# Patient Record
Sex: FEMALE | Race: ASIAN | NOT HISPANIC OR LATINO
[De-identification: names, ages, dates, MRNs, and addresses within clinical notes are randomized per-mention and may not be internally consistent; named-entity substitution may affect disease eponyms.]

---

## 2019-03-19 ENCOUNTER — APPOINTMENT (OUTPATIENT)
Dept: HUMAN REPRODUCTION | Facility: CLINIC | Age: 29
End: 2019-03-19

## 2019-04-15 ENCOUNTER — APPOINTMENT (OUTPATIENT)
Dept: OBGYN | Facility: CLINIC | Age: 29
End: 2019-04-15
Payer: COMMERCIAL

## 2019-04-15 ENCOUNTER — TRANSCRIPTION ENCOUNTER (OUTPATIENT)
Age: 29
End: 2019-04-15

## 2019-04-15 VITALS
BODY MASS INDEX: 33.8 KG/M2 | WEIGHT: 223 LBS | HEIGHT: 68 IN | SYSTOLIC BLOOD PRESSURE: 130 MMHG | DIASTOLIC BLOOD PRESSURE: 80 MMHG

## 2019-04-15 DIAGNOSIS — Z84.89 FAMILY HISTORY OF OTHER SPECIFIED CONDITIONS: ICD-10-CM

## 2019-04-15 DIAGNOSIS — Z80.1 FAMILY HISTORY OF MALIGNANT NEOPLASM OF TRACHEA, BRONCHUS AND LUNG: ICD-10-CM

## 2019-04-15 DIAGNOSIS — Z82.49 FAMILY HISTORY OF ISCHEMIC HEART DISEASE AND OTHER DISEASES OF THE CIRCULATORY SYSTEM: ICD-10-CM

## 2019-04-15 DIAGNOSIS — Z86.39 PERSONAL HISTORY OF OTHER ENDOCRINE, NUTRITIONAL AND METABOLIC DISEASE: ICD-10-CM

## 2019-04-15 DIAGNOSIS — Z83.3 FAMILY HISTORY OF DIABETES MELLITUS: ICD-10-CM

## 2019-04-15 DIAGNOSIS — Z87.42 PERSONAL HISTORY OF OTHER DISEASES OF THE FEMALE GENITAL TRACT: ICD-10-CM

## 2019-04-15 DIAGNOSIS — Z78.9 OTHER SPECIFIED HEALTH STATUS: ICD-10-CM

## 2019-04-15 PROCEDURE — 99385 PREV VISIT NEW AGE 18-39: CPT

## 2019-04-15 NOTE — CHIEF COMPLAINT
[Initial Visit] : initial GYN visit [FreeTextEntry1] : \par \par \par Patient presents for annual GYN examination\par \par Street of abnormal Pap and HPV positive status\par HPV DNA positive September 2017\par 16/18/45 negative September 2017\par Pap smear result unclear\par Colposcopy was performed with cervical biopsy October 2017\par ECC negative\par Biopsy at 10:00 the squamous metaplasia negative for dysplasia\par \par Pap smear negative in June 2, 2018\par HPV DNA negative June 2, 2018\par Gonorrhea and Chlamydia negative June 2, 2018\par \par \par Endocrinology issues\par Hypothyroidism - followed by Dr. Armendariz in endocrinology\par Levothyroxine 50 mcg\par \par \par PCOS - also followed by Dr. Armendariz in endocrinology\par Patient could not tolerate the course of metformin due to nausea and vomiting (September 2018)\par

## 2019-04-15 NOTE — HISTORY OF PRESENT ILLNESS
[Good] : being in good health [Reproductive Age] : is of reproductive age [de-identified] : june 2018 [Continuous] : no continuous [Dull] : no dull [Sharp] : no sharp [Stabbing] : no stabbing [Throbbing] : no throbbing [Burning] : no burning [Itching] : no itching [Mass] : no mass [Stinging] : no stinging [Lesion] : no lesion [Soreness] : no soreness [Discharge] : no discharge [Localized Pain] : no localized pain [Diffused Pain] : no diffused pain [Mass (___cm)] : no palpable mass [Skin Color Change] : no skin color change [Nipple Discharge] : no nipple discharge [Hot Flashes] : no hot flashes [Night Sweats] : no night sweats [Vaginal Itching] : no vaginal itching [Dyspareunia] : no dyspareunia [Mood Changes] : no mood changes [Definite:  ___ (Date)] : the last menstrual period was [unfilled] [Spotting Between  Menses] : no spotting between menses [Normal Amount/Duration] : was of a normal amount and duration [Frequency: Q ___ days] : menstrual periods occur approximately every [unfilled] days [Regular Cycle Intervals] : periods have been regular [Menarche Age: ____] : age at menarche was [unfilled] [Currently In Menopause] : not currently in menopause [Menstrual Cramps] : no menstrual cramps [Experiencing Menopausal Sxs] : not experiencing menopausal symptoms [Age of First Sexual [place] ___] : became sexually active at the age of [unfilled] [Sexually Active] : is sexually active [Monogamous] : is monogamous [Contraception] : uses contraception [IUD] : has an intrauterine device [Male ___] : [unfilled] male [de-identified] : current partner 2013

## 2019-04-15 NOTE — PHYSICAL EXAM
[Awake] : awake [Acute Distress] : no acute distress [Alert] : alert [LAD] : no lymphadenopathy [Thyroid Nodule] : no thyroid nodule [Goiter] : no goiter [Mass] : no breast mass [Soft] : soft [Nipple Discharge] : no nipple discharge [Axillary LAD] : no axillary lymphadenopathy [Tender] : non tender [Distended] : not distended [H/Smegaly] : no hepatosplenomegaly [Oriented x3] : oriented to person, place, and time [Depressed Mood] : not depressed [Flat Affect] : affect not flat [Labia Majora] : labia major [Labia Minora] : labia minora [Normal] : clitoris [No Bleeding] : there was no active vaginal bleeding [IUD String] : did not have an IUD string protruding out [Pap Obtained] : a Pap smear was performed [Discharge] : had no discharge [Motion Tenderness] : there was no cervical motion tenderness [Enlarged ___ wks] : not enlarged [Tenderness] : nontender [Mass ___ cm] : no uterine mass was palpated [Uterine Adnexae] : were not tender and not enlarged [No Tenderness] : no rectal tenderness

## 2019-04-18 LAB
C TRACH RRNA SPEC QL NAA+PROBE: NOT DETECTED
CYTOLOGY CVX/VAG DOC THIN PREP: NORMAL
N GONORRHOEA RRNA SPEC QL NAA+PROBE: NOT DETECTED
SOURCE AMPLIFICATION: NORMAL

## 2019-12-13 ENCOUNTER — RESULT CHARGE (OUTPATIENT)
Age: 29
End: 2019-12-13

## 2019-12-13 ENCOUNTER — APPOINTMENT (OUTPATIENT)
Dept: OBGYN | Facility: CLINIC | Age: 29
End: 2019-12-13
Payer: COMMERCIAL

## 2019-12-13 VITALS
SYSTOLIC BLOOD PRESSURE: 123 MMHG | HEIGHT: 68 IN | BODY MASS INDEX: 34.31 KG/M2 | WEIGHT: 226.38 LBS | DIASTOLIC BLOOD PRESSURE: 88 MMHG

## 2019-12-13 PROCEDURE — 99213 OFFICE O/P EST LOW 20 MIN: CPT

## 2020-01-10 LAB — HCG UR QL: NEGATIVE

## 2020-01-16 NOTE — PHYSICAL EXAM
[Awake] : awake [Alert] : alert [Acute Distress] : no acute distress [Soft] : soft [Tender] : non tender [Oriented x3] : oriented to person, place, and time [Normal] : vagina [No Bleeding] : there was no active vaginal bleeding [Uterine Adnexae] : were not tender and not enlarged

## 2020-01-16 NOTE — REVIEW OF SYSTEMS
[Dysuria] : dysuria [Pelvic Pain] : no pelvic pain [Abn Vag Bleeding] : no abnormal vaginal bleeding [Nl] : Integumentary

## 2020-09-17 ENCOUNTER — APPOINTMENT (OUTPATIENT)
Dept: OBGYN | Facility: CLINIC | Age: 30
End: 2020-09-17
Payer: COMMERCIAL

## 2020-09-17 VITALS
WEIGHT: 226 LBS | HEIGHT: 68 IN | BODY MASS INDEX: 34.25 KG/M2 | DIASTOLIC BLOOD PRESSURE: 62 MMHG | SYSTOLIC BLOOD PRESSURE: 120 MMHG

## 2020-09-17 PROCEDURE — 58301 REMOVE INTRAUTERINE DEVICE: CPT

## 2020-09-17 RX ORDER — CLOTRIMAZOLE AND BETAMETHASONE DIPROPIONATE 10; .5 MG/G; MG/G
1-0.05 CREAM TOPICAL TWICE DAILY
Qty: 1 | Refills: 0 | Status: COMPLETED | COMMUNITY
Start: 2019-12-13 | End: 2020-09-17

## 2020-09-17 NOTE — PROCEDURE
[IUD Removal] : intrauterine device (IUD) removal [Fertility Desired] : fertility desired [Risks] : risks [Benefits] : benefits [Alternatives] : alternatives [Patient] : patient [Speculum Placed] : speculum placed [Strings Visualized] : strings visualized [IUD Discarded] : IUD discarded [Tolerated Well] : Patient tolerated the procedure well [No Complications] : no complications

## 2021-02-25 ENCOUNTER — APPOINTMENT (OUTPATIENT)
Dept: OBGYN | Facility: CLINIC | Age: 31
End: 2021-02-25
Payer: COMMERCIAL

## 2021-02-25 VITALS — TEMPERATURE: 97.1 F

## 2021-02-25 VITALS — SYSTOLIC BLOOD PRESSURE: 110 MMHG | WEIGHT: 228 LBS | BODY MASS INDEX: 34.67 KG/M2 | DIASTOLIC BLOOD PRESSURE: 64 MMHG

## 2021-02-25 DIAGNOSIS — Z30.432 ENCOUNTER FOR REMOVAL OF INTRAUTERINE CONTRACEPTIVE DEVICE: ICD-10-CM

## 2021-02-25 DIAGNOSIS — R87.810 CERVICAL HIGH RISK HUMAN PAPILLOMAVIRUS (HPV) DNA TEST POSITIVE: ICD-10-CM

## 2021-02-25 PROCEDURE — 99072 ADDL SUPL MATRL&STAF TM PHE: CPT

## 2021-02-25 PROCEDURE — 99395 PREV VISIT EST AGE 18-39: CPT

## 2021-02-25 NOTE — COUNSELING
[Nutrition/ Exercise/ Weight Management] : nutrition, exercise, weight management [Body Image] : body image [Vitamins/Supplements] : vitamins/supplements [Breast Self Exam] : breast self exam [Contraception/ Emergency Contraception/ Safe Sexual Practices] : contraception, emergency contraception, safe sexual practices

## 2021-02-26 PROBLEM — Z30.432 ENCOUNTER FOR IUD REMOVAL: Status: RESOLVED | Noted: 2020-09-17 | Resolved: 2021-02-26

## 2021-02-26 NOTE — PHYSICAL EXAM
[Appropriately responsive] : appropriately responsive [Alert] : alert [No Acute Distress] : no acute distress [No Lymphadenopathy] : no lymphadenopathy [No Murmurs] : no murmurs [Soft] : soft [Non-tender] : non-tender [Non-distended] : non-distended [No HSM] : No HSM [No Lesions] : no lesions [No Mass] : no mass [Oriented x3] : oriented x3 [Examination Of The Breasts] : a normal appearance [No Masses] : no breast masses were palpable [Labia Majora] : normal [Labia Minora] : normal [Normal] : normal [Uterine Adnexae] : normal [Tenderness] : nontender

## 2021-03-02 LAB
CYTOLOGY CVX/VAG DOC THIN PREP: NORMAL
HPV HIGH+LOW RISK DNA PNL CVX: NOT DETECTED

## 2021-06-07 ENCOUNTER — NON-APPOINTMENT (OUTPATIENT)
Age: 31
End: 2021-06-07

## 2021-06-10 ENCOUNTER — APPOINTMENT (OUTPATIENT)
Dept: OBGYN | Facility: CLINIC | Age: 31
End: 2021-06-10
Payer: COMMERCIAL

## 2021-06-10 VITALS
BODY MASS INDEX: 35.09 KG/M2 | DIASTOLIC BLOOD PRESSURE: 81 MMHG | SYSTOLIC BLOOD PRESSURE: 113 MMHG | WEIGHT: 231.5 LBS | HEIGHT: 68 IN

## 2021-06-10 LAB
BASOPHILS # BLD AUTO: 0.07 K/UL
BASOPHILS NFR BLD AUTO: 0.4 %
EOSINOPHIL # BLD AUTO: 0.15 K/UL
EOSINOPHIL NFR BLD AUTO: 1 %
HCT VFR BLD CALC: 38.6 %
HGB BLD-MCNC: 12.4 G/DL
IMM GRANULOCYTES NFR BLD AUTO: 0.4 %
LYMPHOCYTES # BLD AUTO: 4.17 K/UL
LYMPHOCYTES NFR BLD AUTO: 26.8 %
MAN DIFF?: NORMAL
MCHC RBC-ENTMCNC: 27.8 PG
MCHC RBC-ENTMCNC: 32.1 GM/DL
MCV RBC AUTO: 86.5 FL
MONOCYTES # BLD AUTO: 0.88 K/UL
MONOCYTES NFR BLD AUTO: 5.6 %
NEUTROPHILS # BLD AUTO: 10.24 K/UL
NEUTROPHILS NFR BLD AUTO: 65.8 %
PLATELET # BLD AUTO: 338 K/UL
RBC # BLD: 4.46 M/UL
RBC # FLD: 13.8 %
WBC # FLD AUTO: 15.58 K/UL

## 2021-06-10 PROCEDURE — 99214 OFFICE O/P EST MOD 30 MIN: CPT

## 2021-06-10 RX ORDER — ASCORBIC ACID, CHOLECALCIFEROL, .ALPHA.-TOCOPHEROL ACETATE, DL-, PYRIDOXINE HYDROCHLORIDE, FOLIC ACID, CYANOCOBALAMIN, BIOTIN, CALCIUM CARBONATE, FERROUS ASPARTO GLYCINATE, IRON, POTASSIUM IODIDE, MAGNESIUM OXIDE, DOCONEXENT AND LOWBUSH BLUEBERRY 60; 1000; 10; 26; 400; 13; 280; 80; 9; 9; 150; 25; 350; 25; 600 MG/1; [IU]/1; [IU]/1; MG/1; UG/1; UG/1; UG/1; MG/1; MG/1; MG/1; UG/1; MG/1; MG/1; MG/1; UG/1
18-0.6-0.4-35 CAPSULE, GELATIN COATED ORAL
Qty: 3 | Refills: 3 | Status: COMPLETED | COMMUNITY
Start: 2020-09-17 | End: 2021-06-10

## 2021-06-10 RX ORDER — IRON, FOLIC ACID, CYANOCOBALAMIN, ASCORBIC ACID, DOCUSATE SODIUM AND DHA
90-1 & 300 KIT ORAL
Qty: 90 | Refills: 3 | Status: COMPLETED | COMMUNITY
Start: 2020-09-18 | End: 2021-06-10

## 2021-06-10 RX ORDER — .ALPHA.-TOCOPHEROL ACETATE, DL-, ASCORBIC ACID, CHOLECALCIFEROL, CYANOCOBALAMIN, FOLIC ACID, FERROUS FUMARATE, CALCIUM PHOSPHATE, DIBASIC, ANHYDROUS, NIACINAMIDE, PYRIDOXINE HYDROCHLORIDE, RIBOFLAVIN, THIAMINE MONONITRATE, AND VITAMIN A ACETATE 15; 60; 400; 4.5; 1; 27; 50; 13.5; 1.05; 1.2; 1.05; 25 [IU]/1; MG/1; [IU]/1; UG/1; MG/1; MG/1; MG/1; MG/1; MG/1; MG/1; MG/1; [IU]/1
TABLET ORAL
Refills: 0 | Status: ACTIVE | COMMUNITY

## 2021-06-10 NOTE — PLAN
[FreeTextEntry1] : early IUP\par labs\par repeat sono in a week\par \par \par likely migraine bur r/o TIA (low susp)\par note pt reports jaw pain w migraines in past\par tylenol/benadryl (if nausea pressent) for migraine\par neurology eval\par \par Dr PIERRE Figueroa

## 2021-06-10 NOTE — PROCEDURE
[Transvaginal OB Sonogram] : Transvaginal OB Sonogram [Intrauterine Pregnancy] : intrauterine pregnancy [Yolk Sac] : yolk sac present [CRL: ___ (mm)] : CRL - [unfilled]Umm [Fetal Heart] : no fetal heart [FreeTextEntry1] : early preg\par \par HCG level, repeat and RTO in 1 week

## 2021-06-10 NOTE — HISTORY OF PRESENT ILLNESS
[FreeTextEntry1] : 31yo  LMP here w missed menses,  cramping, headaches\par here to establish viability of preg w + HCG\par \par pt w one day od headache, lower facial numbness and slurred speak\par took tyelonol and slept w improvement in pain\par no pain today\par no Gold, CP, SOB\par no F/c\par no reported illnesses

## 2021-06-10 NOTE — COUNSELING
[Nutrition/ Exercise/ Weight Management] : nutrition, exercise, weight management [Body Image] : body image [Vitamins/Supplements] : vitamins/supplements [Bladder Hygiene] : bladder hygiene [Preconception Care/ Fertility options] : preconception care, fertility options [STD (testing, results, tx)] : STD (testing, results, tx)

## 2021-06-10 NOTE — PHYSICAL EXAM
[Appropriately responsive] : appropriately responsive [No Acute Distress] : no acute distress [Labia Majora] : normal [Labia Minora] : normal [Normal] : normal [Uterine Adnexae] : normal [FreeTextEntry6] : no CMT

## 2021-06-10 NOTE — REVIEW OF SYSTEMS
[Headache] : headache [Anxiety] : anxiety [Negative] : Heme/Lymph [Sore Throat] : no sore throat [Cough] : no cough [Chest Pain] : no chest pain [Palpitations] : no palpitations [Abdominal Pain] : no abdominal pain [Constipation] : no constipation [Urgency] : no urgency [Frequency] : no frequency [Dizziness] : no dizziness [Fainting] : no fainting [Convulsions] : no convulsions

## 2021-06-15 LAB
ABO + RH PNL BLD: NORMAL
BLD GP AB SCN SERPL QL: NORMAL
C TRACH RRNA SPEC QL NAA+PROBE: NOT DETECTED
N GONORRHOEA RRNA SPEC QL NAA+PROBE: NOT DETECTED
PROGEST SERPL-MCNC: 13.1 NG/ML
SOURCE AMPLIFICATION: NORMAL

## 2021-06-17 LAB
HCG SERPL-MCNC: 5870 MIU/ML
HCG SERPL-MCNC: ABNORMAL MIU/ML
TSH SERPL-ACNC: 2.65 UIU/ML

## 2021-06-24 ENCOUNTER — APPOINTMENT (OUTPATIENT)
Dept: OBGYN | Facility: CLINIC | Age: 31
End: 2021-06-24
Payer: COMMERCIAL

## 2021-06-24 ENCOUNTER — NON-APPOINTMENT (OUTPATIENT)
Age: 31
End: 2021-06-24

## 2021-06-24 VITALS
SYSTOLIC BLOOD PRESSURE: 111 MMHG | HEIGHT: 68 IN | BODY MASS INDEX: 35.09 KG/M2 | WEIGHT: 231.5 LBS | DIASTOLIC BLOOD PRESSURE: 80 MMHG

## 2021-06-24 DIAGNOSIS — Z31.69 ENCOUNTER FOR OTHER GENERAL COUNSELING AND ADVICE ON PROCREATION: ICD-10-CM

## 2021-06-24 DIAGNOSIS — N94.89 OTHER SPECIFIED CONDITIONS ASSOCIATED WITH FEMALE GENITAL ORGANS AND MENSTRUAL CYCLE: ICD-10-CM

## 2021-06-24 DIAGNOSIS — Z87.59 PERSONAL HISTORY OF OTHER COMPLICATIONS OF PREGNANCY, CHILDBIRTH AND THE PUERPERIUM: ICD-10-CM

## 2021-06-24 DIAGNOSIS — N76.0 ACUTE VAGINITIS: ICD-10-CM

## 2021-06-24 DIAGNOSIS — R10.2 PELVIC AND PERINEAL PAIN: ICD-10-CM

## 2021-06-24 DIAGNOSIS — Z30.431 ENCOUNTER FOR ROUTINE CHECKING OF INTRAUTERINE CONTRACEPTIVE DEVICE: ICD-10-CM

## 2021-06-24 PROCEDURE — 0501F PRENATAL FLOW SHEET: CPT

## 2021-07-12 ENCOUNTER — APPOINTMENT (OUTPATIENT)
Dept: OBGYN | Facility: CLINIC | Age: 31
End: 2021-07-12
Payer: COMMERCIAL

## 2021-07-12 ENCOUNTER — NON-APPOINTMENT (OUTPATIENT)
Age: 31
End: 2021-07-12

## 2021-07-12 VITALS
SYSTOLIC BLOOD PRESSURE: 114 MMHG | HEIGHT: 68 IN | DIASTOLIC BLOOD PRESSURE: 78 MMHG | BODY MASS INDEX: 34.86 KG/M2 | WEIGHT: 230 LBS

## 2021-07-12 PROCEDURE — 0502F SUBSEQUENT PRENATAL CARE: CPT

## 2021-07-12 RX ORDER — AMOXICILLIN 500 MG/1
500 TABLET, FILM COATED ORAL
Qty: 21 | Refills: 0 | Status: COMPLETED | COMMUNITY
Start: 2021-05-15

## 2021-07-15 LAB
ABO + RH PNL BLD: NORMAL
ALBUMIN SERPL ELPH-MCNC: 3.8 G/DL
ALP BLD-CCNC: 79 U/L
ALT SERPL-CCNC: 12 U/L
ANION GAP SERPL CALC-SCNC: 10 MMOL/L
AST SERPL-CCNC: 13 U/L
B19V IGG SER QL IA: 0.33 INDEX
B19V IGG+IGM SER-IMP: NEGATIVE
B19V IGG+IGM SER-IMP: NORMAL
B19V IGM FLD-ACNC: 0.14 INDEX
B19V IGM SER-ACNC: NEGATIVE
BACTERIA UR CULT: NORMAL
BASOPHILS # BLD AUTO: 0.05 K/UL
BASOPHILS NFR BLD AUTO: 0.3 %
BILIRUB SERPL-MCNC: 0.2 MG/DL
BLD GP AB SCN SERPL QL: NORMAL
BUN SERPL-MCNC: 7 MG/DL
CALCIUM SERPL-MCNC: 9.5 MG/DL
CHLORIDE SERPL-SCNC: 106 MMOL/L
CMV IGG SERPL QL: 8.2 U/ML
CMV IGG SERPL-IMP: POSITIVE
CMV IGM SERPL QL: <8 AU/ML
CMV IGM SERPL QL: NEGATIVE
CO2 SERPL-SCNC: 18 MMOL/L
CREAT SERPL-MCNC: 0.56 MG/DL
EOSINOPHIL # BLD AUTO: 0.13 K/UL
EOSINOPHIL NFR BLD AUTO: 0.9 %
GLUCOSE 1H P 50 G GLC PO SERPL-MCNC: 98 MG/DL
GLUCOSE SERPL-MCNC: 117 MG/DL
HBV SURFACE AG SER QL: NONREACTIVE
HCT VFR BLD CALC: 37.7 %
HCV AB SER QL: NONREACTIVE
HCV S/CO RATIO: 0.08 S/CO
HGB A MFR BLD: 97.6 %
HGB A2 MFR BLD: 2.4 %
HGB BLD-MCNC: 11.9 G/DL
HGB FRACT BLD-IMP: NORMAL
HIV1+2 AB SPEC QL IA.RAPID: NONREACTIVE
IMM GRANULOCYTES NFR BLD AUTO: 0.5 %
LEAD BLD-MCNC: <1 UG/DL
LYMPHOCYTES # BLD AUTO: 3.11 K/UL
LYMPHOCYTES NFR BLD AUTO: 21.6 %
MAN DIFF?: NORMAL
MCHC RBC-ENTMCNC: 27.7 PG
MCHC RBC-ENTMCNC: 31.6 GM/DL
MCV RBC AUTO: 87.9 FL
MEV IGG FLD QL IA: <5 AU/ML
MEV IGG+IGM SER-IMP: NEGATIVE
MONOCYTES # BLD AUTO: 0.74 K/UL
MONOCYTES NFR BLD AUTO: 5.1 %
NEUTROPHILS # BLD AUTO: 10.31 K/UL
NEUTROPHILS NFR BLD AUTO: 71.6 %
PLATELET # BLD AUTO: 301 K/UL
POTASSIUM SERPL-SCNC: 3.7 MMOL/L
PROT SERPL-MCNC: 6.1 G/DL
RBC # BLD: 4.29 M/UL
RBC # FLD: 13.7 %
RUBV IGG FLD-ACNC: 1 INDEX
RUBV IGG SER-IMP: NORMAL
SODIUM SERPL-SCNC: 135 MMOL/L
T PALLIDUM AB SER QL IA: NEGATIVE
TSH SERPL-ACNC: 1.12 UIU/ML
VZV AB TITR SER: NEGATIVE
VZV IGG SER IF-ACNC: 20 INDEX
WBC # FLD AUTO: 14.41 K/UL

## 2021-07-16 ENCOUNTER — TRANSCRIPTION ENCOUNTER (OUTPATIENT)
Age: 31
End: 2021-07-16

## 2021-07-16 ENCOUNTER — NON-APPOINTMENT (OUTPATIENT)
Age: 31
End: 2021-07-16

## 2021-07-20 LAB
AR GENE MUT ANL BLD/T: NORMAL
CFTR MUT TESTED BLD/T: NEGATIVE
CLARI ADDITIONAL INFO: NORMAL
CLARI CHROMOSOME 13: NORMAL
CLARI CHROMOSOME 18: NORMAL
CLARI CHROMOSOME 21: NORMAL
CLARI SEX CHROMOSOMES: NORMAL
CLARITEST NIPT: NORMAL
FMR1 GENE MUT ANL BLD/T: NORMAL

## 2021-07-26 ENCOUNTER — APPOINTMENT (OUTPATIENT)
Dept: ANTEPARTUM | Facility: CLINIC | Age: 31
End: 2021-07-26
Payer: COMMERCIAL

## 2021-07-26 ENCOUNTER — ASOB RESULT (OUTPATIENT)
Age: 31
End: 2021-07-26

## 2021-07-26 ENCOUNTER — LABORATORY RESULT (OUTPATIENT)
Age: 31
End: 2021-07-26

## 2021-07-26 PROCEDURE — 76813 OB US NUCHAL MEAS 1 GEST: CPT | Mod: 59

## 2021-07-26 PROCEDURE — 76801 OB US < 14 WKS SINGLE FETUS: CPT

## 2021-08-10 ENCOUNTER — NON-APPOINTMENT (OUTPATIENT)
Age: 31
End: 2021-08-10

## 2021-08-16 ENCOUNTER — APPOINTMENT (OUTPATIENT)
Dept: OBGYN | Facility: CLINIC | Age: 31
End: 2021-08-16
Payer: COMMERCIAL

## 2021-08-16 VITALS — DIASTOLIC BLOOD PRESSURE: 80 MMHG | WEIGHT: 227 LBS | SYSTOLIC BLOOD PRESSURE: 116 MMHG | BODY MASS INDEX: 34.52 KG/M2

## 2021-08-16 DIAGNOSIS — Z34.91 ENCOUNTER FOR SUPERVISION OF NORMAL PREGNANCY, UNSPECIFIED, FIRST TRIMESTER: ICD-10-CM

## 2021-08-16 PROCEDURE — 0502F SUBSEQUENT PRENATAL CARE: CPT

## 2021-08-25 LAB
1ST TRIMESTER DATA: NORMAL
2ND TRIMESTER DATA: NORMAL
AFP PNL SERPL: NORMAL
AFP SERPL-ACNC: NORMAL
AFP SERPL-ACNC: NORMAL
B-HCG FREE SERPL-MCNC: NORMAL
CLINICAL BIOCHEMIST REVIEW: NORMAL
FREE BETA HCG 1ST TRIMESTER: NORMAL
INHIBIN A SERPL-MCNC: NORMAL
NOTES NTD: NORMAL
NT: NORMAL
PAPP-A SERPL-ACNC: NORMAL
U ESTRIOL SERPL-SCNC: NORMAL

## 2021-09-15 ENCOUNTER — NON-APPOINTMENT (OUTPATIENT)
Age: 31
End: 2021-09-15

## 2021-09-17 ENCOUNTER — ASOB RESULT (OUTPATIENT)
Age: 31
End: 2021-09-17

## 2021-09-17 ENCOUNTER — APPOINTMENT (OUTPATIENT)
Dept: ANTEPARTUM | Facility: CLINIC | Age: 31
End: 2021-09-17
Payer: COMMERCIAL

## 2021-09-17 ENCOUNTER — APPOINTMENT (OUTPATIENT)
Dept: OBGYN | Facility: CLINIC | Age: 31
End: 2021-09-17
Payer: COMMERCIAL

## 2021-09-17 VITALS — WEIGHT: 232 LBS | DIASTOLIC BLOOD PRESSURE: 60 MMHG | BODY MASS INDEX: 35.28 KG/M2 | SYSTOLIC BLOOD PRESSURE: 112 MMHG

## 2021-09-17 PROCEDURE — 76811 OB US DETAILED SNGL FETUS: CPT

## 2021-09-17 PROCEDURE — 0502F SUBSEQUENT PRENATAL CARE: CPT

## 2021-09-27 ENCOUNTER — APPOINTMENT (OUTPATIENT)
Dept: OBGYN | Facility: CLINIC | Age: 31
End: 2021-09-27

## 2021-09-27 ENCOUNTER — NON-APPOINTMENT (OUTPATIENT)
Age: 31
End: 2021-09-27

## 2021-09-29 ENCOUNTER — APPOINTMENT (OUTPATIENT)
Dept: ANTEPARTUM | Facility: CLINIC | Age: 31
End: 2021-09-29

## 2021-09-30 ENCOUNTER — NON-APPOINTMENT (OUTPATIENT)
Age: 31
End: 2021-09-30

## 2021-10-28 ENCOUNTER — NON-APPOINTMENT (OUTPATIENT)
Age: 31
End: 2021-10-28

## 2021-10-28 ENCOUNTER — APPOINTMENT (OUTPATIENT)
Dept: OBGYN | Facility: CLINIC | Age: 31
End: 2021-10-28
Payer: COMMERCIAL

## 2021-10-28 VITALS
BODY MASS INDEX: 35.47 KG/M2 | SYSTOLIC BLOOD PRESSURE: 128 MMHG | WEIGHT: 233.25 LBS | DIASTOLIC BLOOD PRESSURE: 88 MMHG

## 2021-10-28 VITALS — DIASTOLIC BLOOD PRESSURE: 78 MMHG | SYSTOLIC BLOOD PRESSURE: 124 MMHG

## 2021-10-28 VITALS — SYSTOLIC BLOOD PRESSURE: 130 MMHG | DIASTOLIC BLOOD PRESSURE: 90 MMHG

## 2021-10-28 DIAGNOSIS — J06.9 ACUTE UPPER RESPIRATORY INFECTION, UNSPECIFIED: ICD-10-CM

## 2021-10-28 PROCEDURE — 81003 URINALYSIS AUTO W/O SCOPE: CPT | Mod: QW

## 2021-10-28 PROCEDURE — 90656 IIV3 VACC NO PRSV 0.5 ML IM: CPT

## 2021-10-28 PROCEDURE — 0502F SUBSEQUENT PRENATAL CARE: CPT

## 2021-10-28 PROCEDURE — 90471 IMMUNIZATION ADMIN: CPT

## 2021-10-28 RX ORDER — AZITHROMYCIN 250 MG/1
250 TABLET, FILM COATED ORAL
Qty: 1 | Refills: 0 | Status: COMPLETED | COMMUNITY
Start: 2021-09-29 | End: 2021-10-28

## 2021-10-29 LAB
ALBUMIN SERPL ELPH-MCNC: 3.5 G/DL
ALP BLD-CCNC: 99 U/L
ALT SERPL-CCNC: 14 U/L
ANION GAP SERPL CALC-SCNC: 12 MMOL/L
AST SERPL-CCNC: 13 U/L
BASOPHILS # BLD AUTO: 0.03 K/UL
BASOPHILS NFR BLD AUTO: 0.2 %
BILIRUB SERPL-MCNC: <0.2 MG/DL
BUN SERPL-MCNC: 8 MG/DL
CALCIUM SERPL-MCNC: 10 MG/DL
CHLORIDE SERPL-SCNC: 107 MMOL/L
CO2 SERPL-SCNC: 19 MMOL/L
CREAT SERPL-MCNC: 0.57 MG/DL
EOSINOPHIL # BLD AUTO: 0.09 K/UL
EOSINOPHIL NFR BLD AUTO: 0.6 %
GLUCOSE 1H P 50 G GLC PO SERPL-MCNC: 99 MG/DL
GLUCOSE SERPL-MCNC: 104 MG/DL
HCT VFR BLD CALC: 35.7 %
HGB BLD-MCNC: 11.4 G/DL
HIV1+2 AB SPEC QL IA.RAPID: NONREACTIVE
IMM GRANULOCYTES NFR BLD AUTO: 0.7 %
LYMPHOCYTES # BLD AUTO: 2.46 K/UL
LYMPHOCYTES NFR BLD AUTO: 16.9 %
MAN DIFF?: NORMAL
MCHC RBC-ENTMCNC: 27.7 PG
MCHC RBC-ENTMCNC: 31.9 GM/DL
MCV RBC AUTO: 86.7 FL
MONOCYTES # BLD AUTO: 0.88 K/UL
MONOCYTES NFR BLD AUTO: 6.1 %
NEUTROPHILS # BLD AUTO: 10.96 K/UL
NEUTROPHILS NFR BLD AUTO: 75.5 %
PLATELET # BLD AUTO: 307 K/UL
POTASSIUM SERPL-SCNC: 4 MMOL/L
PROT SERPL-MCNC: 6.3 G/DL
RBC # BLD: 4.12 M/UL
RBC # FLD: 13.9 %
SODIUM SERPL-SCNC: 138 MMOL/L
T PALLIDUM AB SER QL IA: NEGATIVE
TSH SERPL-ACNC: 0.62 UIU/ML
WBC # FLD AUTO: 14.52 K/UL

## 2021-11-04 ENCOUNTER — NON-APPOINTMENT (OUTPATIENT)
Age: 31
End: 2021-11-04

## 2021-11-11 ENCOUNTER — ASOB RESULT (OUTPATIENT)
Age: 31
End: 2021-11-11

## 2021-11-11 ENCOUNTER — APPOINTMENT (OUTPATIENT)
Dept: ANTEPARTUM | Facility: CLINIC | Age: 31
End: 2021-11-11
Payer: COMMERCIAL

## 2021-11-11 PROCEDURE — 76816 OB US FOLLOW-UP PER FETUS: CPT

## 2021-12-03 ENCOUNTER — NON-APPOINTMENT (OUTPATIENT)
Age: 31
End: 2021-12-03

## 2021-12-03 ENCOUNTER — APPOINTMENT (OUTPATIENT)
Dept: OBGYN | Facility: CLINIC | Age: 31
End: 2021-12-03
Payer: COMMERCIAL

## 2021-12-03 VITALS — SYSTOLIC BLOOD PRESSURE: 120 MMHG | WEIGHT: 236.13 LBS | BODY MASS INDEX: 35.9 KG/M2 | DIASTOLIC BLOOD PRESSURE: 76 MMHG

## 2021-12-03 DIAGNOSIS — R29.810 FACIAL WEAKNESS: ICD-10-CM

## 2021-12-03 PROCEDURE — 81003 URINALYSIS AUTO W/O SCOPE: CPT | Mod: QW

## 2021-12-03 PROCEDURE — 90715 TDAP VACCINE 7 YRS/> IM: CPT

## 2021-12-03 PROCEDURE — 0502F SUBSEQUENT PRENATAL CARE: CPT

## 2021-12-03 PROCEDURE — 90471 IMMUNIZATION ADMIN: CPT

## 2021-12-17 ENCOUNTER — NON-APPOINTMENT (OUTPATIENT)
Age: 31
End: 2021-12-17

## 2021-12-17 ENCOUNTER — APPOINTMENT (OUTPATIENT)
Dept: OBGYN | Facility: CLINIC | Age: 31
End: 2021-12-17

## 2021-12-17 VITALS — SYSTOLIC BLOOD PRESSURE: 110 MMHG | WEIGHT: 240 LBS | BODY MASS INDEX: 36.49 KG/M2 | DIASTOLIC BLOOD PRESSURE: 60 MMHG

## 2021-12-17 DIAGNOSIS — Z00.00 ENCOUNTER FOR GENERAL ADULT MEDICAL EXAMINATION W/OUT ABNORMAL FINDINGS: ICD-10-CM

## 2021-12-18 LAB — TSH SERPL-ACNC: 1.54 UIU/ML

## 2021-12-20 ENCOUNTER — NON-APPOINTMENT (OUTPATIENT)
Age: 31
End: 2021-12-20

## 2021-12-20 LAB — BACTERIA UR CULT: NORMAL

## 2021-12-23 ENCOUNTER — ASOB RESULT (OUTPATIENT)
Age: 31
End: 2021-12-23

## 2021-12-23 ENCOUNTER — APPOINTMENT (OUTPATIENT)
Dept: ANTEPARTUM | Facility: CLINIC | Age: 31
End: 2021-12-23
Payer: COMMERCIAL

## 2021-12-23 PROCEDURE — 76816 OB US FOLLOW-UP PER FETUS: CPT

## 2022-01-03 ENCOUNTER — APPOINTMENT (OUTPATIENT)
Dept: OBGYN | Facility: CLINIC | Age: 32
End: 2022-01-03
Payer: COMMERCIAL

## 2022-01-03 VITALS
DIASTOLIC BLOOD PRESSURE: 70 MMHG | WEIGHT: 239 LBS | BODY MASS INDEX: 36.22 KG/M2 | HEIGHT: 68 IN | SYSTOLIC BLOOD PRESSURE: 110 MMHG

## 2022-01-03 PROCEDURE — 0502F SUBSEQUENT PRENATAL CARE: CPT

## 2022-01-03 PROCEDURE — 81003 URINALYSIS AUTO W/O SCOPE: CPT | Mod: QW

## 2022-01-05 ENCOUNTER — NON-APPOINTMENT (OUTPATIENT)
Age: 32
End: 2022-01-05

## 2022-01-10 ENCOUNTER — APPOINTMENT (OUTPATIENT)
Dept: OBGYN | Facility: CLINIC | Age: 32
End: 2022-01-10

## 2022-01-11 ENCOUNTER — NON-APPOINTMENT (OUTPATIENT)
Age: 32
End: 2022-01-11

## 2022-01-11 ENCOUNTER — APPOINTMENT (OUTPATIENT)
Dept: OBGYN | Facility: CLINIC | Age: 32
End: 2022-01-11
Payer: COMMERCIAL

## 2022-01-11 PROCEDURE — 99213 OFFICE O/P EST LOW 20 MIN: CPT | Mod: 95

## 2022-01-18 ENCOUNTER — APPOINTMENT (OUTPATIENT)
Dept: OBGYN | Facility: CLINIC | Age: 32
End: 2022-01-18

## 2022-01-20 ENCOUNTER — ASOB RESULT (OUTPATIENT)
Age: 32
End: 2022-01-20

## 2022-01-20 ENCOUNTER — APPOINTMENT (OUTPATIENT)
Dept: OBGYN | Facility: CLINIC | Age: 32
End: 2022-01-20
Payer: COMMERCIAL

## 2022-01-20 ENCOUNTER — APPOINTMENT (OUTPATIENT)
Dept: ANTEPARTUM | Facility: CLINIC | Age: 32
End: 2022-01-20

## 2022-01-20 ENCOUNTER — NON-APPOINTMENT (OUTPATIENT)
Age: 32
End: 2022-01-20

## 2022-01-20 ENCOUNTER — APPOINTMENT (OUTPATIENT)
Dept: ANTEPARTUM | Facility: CLINIC | Age: 32
End: 2022-01-20
Payer: COMMERCIAL

## 2022-01-20 VITALS
HEIGHT: 68 IN | DIASTOLIC BLOOD PRESSURE: 70 MMHG | WEIGHT: 239 LBS | BODY MASS INDEX: 36.22 KG/M2 | SYSTOLIC BLOOD PRESSURE: 110 MMHG

## 2022-01-20 PROCEDURE — 76816 OB US FOLLOW-UP PER FETUS: CPT

## 2022-01-20 PROCEDURE — 76818 FETAL BIOPHYS PROFILE W/NST: CPT

## 2022-01-20 PROCEDURE — 0502F SUBSEQUENT PRENATAL CARE: CPT

## 2022-01-23 LAB
GP B STREP DNA SPEC QL NAA+PROBE: DETECTED
GP B STREP DNA SPEC QL NAA+PROBE: NORMAL
SOURCE GBS: NORMAL

## 2022-01-24 ENCOUNTER — APPOINTMENT (OUTPATIENT)
Dept: ANTEPARTUM | Facility: CLINIC | Age: 32
End: 2022-01-24
Payer: COMMERCIAL

## 2022-01-24 ENCOUNTER — NON-APPOINTMENT (OUTPATIENT)
Age: 32
End: 2022-01-24

## 2022-01-24 ENCOUNTER — APPOINTMENT (OUTPATIENT)
Dept: OBGYN | Facility: CLINIC | Age: 32
End: 2022-01-24
Payer: COMMERCIAL

## 2022-01-24 ENCOUNTER — ASOB RESULT (OUTPATIENT)
Age: 32
End: 2022-01-24

## 2022-01-24 VITALS
BODY MASS INDEX: 36.58 KG/M2 | SYSTOLIC BLOOD PRESSURE: 120 MMHG | HEIGHT: 68 IN | DIASTOLIC BLOOD PRESSURE: 80 MMHG | WEIGHT: 241.38 LBS

## 2022-01-24 PROCEDURE — 76820 UMBILICAL ARTERY ECHO: CPT

## 2022-01-24 PROCEDURE — 76818 FETAL BIOPHYS PROFILE W/NST: CPT

## 2022-01-24 PROCEDURE — 99213 OFFICE O/P EST LOW 20 MIN: CPT

## 2022-01-24 PROCEDURE — 99446 NTRPROF PH1/NTRNET/EHR 5-10: CPT

## 2022-01-25 ENCOUNTER — INPATIENT (INPATIENT)
Facility: HOSPITAL | Age: 32
LOS: 2 days | Discharge: HOME | End: 2022-01-28
Attending: OBSTETRICS & GYNECOLOGY | Admitting: OBSTETRICS & GYNECOLOGY
Payer: COMMERCIAL

## 2022-01-25 ENCOUNTER — APPOINTMENT (OUTPATIENT)
Dept: OBGYN | Facility: CLINIC | Age: 32
End: 2022-01-25

## 2022-01-25 ENCOUNTER — APPOINTMENT (OUTPATIENT)
Dept: ANTEPARTUM | Facility: CLINIC | Age: 32
End: 2022-01-25

## 2022-01-25 VITALS — SYSTOLIC BLOOD PRESSURE: 116 MMHG | DIASTOLIC BLOOD PRESSURE: 78 MMHG | HEART RATE: 118 BPM

## 2022-01-25 DIAGNOSIS — Z34.93 ENCOUNTER FOR SUPERVISION OF NORMAL PREGNANCY, UNSPECIFIED, THIRD TRIMESTER: ICD-10-CM

## 2022-01-25 LAB
BASOPHILS # BLD AUTO: 0.03 K/UL — SIGNIFICANT CHANGE UP (ref 0–0.2)
BASOPHILS NFR BLD AUTO: 0.2 % — SIGNIFICANT CHANGE UP (ref 0–2)
BLD GP AB SCN SERPL QL: NEGATIVE — SIGNIFICANT CHANGE UP
COVID-19 SPIKE DOMAIN AB INTERP: POSITIVE
COVID-19 SPIKE DOMAIN ANTIBODY RESULT: >250 U/ML — HIGH
EOSINOPHIL # BLD AUTO: 0.14 K/UL — SIGNIFICANT CHANGE UP (ref 0–0.5)
EOSINOPHIL NFR BLD AUTO: 0.9 % — SIGNIFICANT CHANGE UP (ref 0–6)
HCT VFR BLD CALC: 34.2 % — LOW (ref 34.5–45)
HGB BLD-MCNC: 11.3 G/DL — LOW (ref 11.5–15.5)
IMM GRANULOCYTES NFR BLD AUTO: 0.9 % — SIGNIFICANT CHANGE UP (ref 0–1.5)
LYMPHOCYTES # BLD AUTO: 20.8 % — SIGNIFICANT CHANGE UP (ref 13–44)
LYMPHOCYTES # BLD AUTO: 3.31 K/UL — HIGH (ref 1–3.3)
MCHC RBC-ENTMCNC: 27.8 PG — SIGNIFICANT CHANGE UP (ref 27–34)
MCHC RBC-ENTMCNC: 33 GM/DL — SIGNIFICANT CHANGE UP (ref 32–36)
MCV RBC AUTO: 84 FL — SIGNIFICANT CHANGE UP (ref 80–100)
MONOCYTES # BLD AUTO: 1.28 K/UL — HIGH (ref 0–0.9)
MONOCYTES NFR BLD AUTO: 8.1 % — SIGNIFICANT CHANGE UP (ref 2–14)
NEUTROPHILS # BLD AUTO: 10.98 K/UL — HIGH (ref 1.8–7.4)
NEUTROPHILS NFR BLD AUTO: 69.1 % — SIGNIFICANT CHANGE UP (ref 43–77)
NRBC # BLD: 0 /100 WBCS — SIGNIFICANT CHANGE UP (ref 0–0)
PLATELET # BLD AUTO: 280 K/UL — SIGNIFICANT CHANGE UP (ref 150–400)
RBC # BLD: 4.07 M/UL — SIGNIFICANT CHANGE UP (ref 3.8–5.2)
RBC # FLD: 14 % — SIGNIFICANT CHANGE UP (ref 10.3–14.5)
RH IG SCN BLD-IMP: POSITIVE — SIGNIFICANT CHANGE UP
SARS-COV-2 IGG+IGM SERPL QL IA: >250 U/ML — HIGH
SARS-COV-2 IGG+IGM SERPL QL IA: POSITIVE
T PALLIDUM AB TITR SER: NEGATIVE — SIGNIFICANT CHANGE UP
WBC # BLD: 15.89 K/UL — HIGH (ref 3.8–10.5)
WBC # FLD AUTO: 15.89 K/UL — HIGH (ref 3.8–10.5)

## 2022-01-25 RX ORDER — CITRIC ACID/SODIUM CITRATE 300-500 MG
15 SOLUTION, ORAL ORAL EVERY 6 HOURS
Refills: 0 | Status: DISCONTINUED | OUTPATIENT
Start: 2022-01-25 | End: 2022-01-26

## 2022-01-25 RX ORDER — OXYTOCIN 10 UNIT/ML
4 VIAL (ML) INJECTION
Qty: 30 | Refills: 0 | Status: DISCONTINUED | OUTPATIENT
Start: 2022-01-25 | End: 2022-01-26

## 2022-01-25 RX ORDER — OXYTOCIN 10 UNIT/ML
333.33 VIAL (ML) INJECTION
Qty: 20 | Refills: 0 | Status: DISCONTINUED | OUTPATIENT
Start: 2022-01-25 | End: 2022-01-28

## 2022-01-25 RX ORDER — SODIUM CHLORIDE 9 MG/ML
1000 INJECTION, SOLUTION INTRAVENOUS
Refills: 0 | Status: DISCONTINUED | OUTPATIENT
Start: 2022-01-25 | End: 2022-01-26

## 2022-01-25 RX ORDER — AMPICILLIN TRIHYDRATE 250 MG
1 CAPSULE ORAL EVERY 4 HOURS
Refills: 0 | Status: DISCONTINUED | OUTPATIENT
Start: 2022-01-25 | End: 2022-01-26

## 2022-01-25 RX ORDER — AMPICILLIN TRIHYDRATE 250 MG
2 CAPSULE ORAL ONCE
Refills: 0 | Status: COMPLETED | OUTPATIENT
Start: 2022-01-25 | End: 2022-01-25

## 2022-01-25 RX ADMIN — Medication 108 GRAM(S): at 11:07

## 2022-01-25 RX ADMIN — SODIUM CHLORIDE 125 MILLILITER(S): 9 INJECTION, SOLUTION INTRAVENOUS at 19:20

## 2022-01-25 RX ADMIN — Medication 4 MILLIUNIT(S)/MIN: at 19:20

## 2022-01-25 RX ADMIN — Medication 216 GRAM(S): at 02:32

## 2022-01-25 RX ADMIN — Medication 4 MILLIUNIT(S)/MIN: at 08:01

## 2022-01-25 RX ADMIN — Medication 108 GRAM(S): at 15:11

## 2022-01-25 RX ADMIN — Medication 108 GRAM(S): at 19:20

## 2022-01-25 RX ADMIN — Medication 108 GRAM(S): at 23:18

## 2022-01-25 RX ADMIN — Medication 108 GRAM(S): at 06:48

## 2022-01-25 NOTE — OB PROVIDER H&P - HISTORY OF PRESENT ILLNESS
R1 H&P    Pt is a 30y/o  at 38+6 admitted for IOL 2/2 IUGR (AC 8th percentile). Dopplers wnl. Patient has no complaints today. +FM, -Cx, -VB, -LOF  Prenatal course uncomplicated  GBS pos  EFW 2978    OBHx:   TOP x1 s/p D+C  GynHx: PCOS, +abnormal paps, -fibroids, -STIs  PMHx: Hashimotos  PSHx: Ear tube insertion at 6 years old  Med: Levotyhroxine 112mcg  All: Spironolactone (Hives)  SH: Denies toxic habits x3, +depression, +anxiety

## 2022-01-25 NOTE — CHART NOTE - NSCHARTNOTEFT_GEN_A_CORE
Patient seen at bedside 2/2 prolonged deceleration (4min) on EFM. Spontaneous recovery of fetal heartrate with repositioning to left lateral tilt. Pitocin held.     SVE: 10/100/-1     EFM: Category II      - Hold Pitocin until tracing recovery    - Femi Pope, PGY-3

## 2022-01-25 NOTE — OB PROVIDER H&P - ASSESSMENT
A&P: 31 year old  @ 30+6 presents to L and D for IOL 2/2 IUGR (AC 8th percentile), doppler wnl. No complaints today  Labor: admit to L&D  -PO cytotec x2, then patient 4 pitocin  -amp for GBS prophylaxis  -routine labs  -EFM/toco  -NPO, IV hydration  Fetal: cat 1 tracing, fetal status reassuring  GBS: pos  Analgesia: PRN    per plan w Dr. Dudley Granger, PGY-1

## 2022-01-25 NOTE — PRE-ANESTHESIA EVALUATION ADULT - NSANTHPMHFT_GEN_ALL_CORE
31 year old  @ 38+6 presents to L and D for IOL 2/2 IUGR. Hx of hypothyroidism, pcos, hpv 2018.     Endorses easy bruising, denies easy bleeding. Worked up b/ hematologist and found no issues.    Endorses sciatic pain in beginning of pregnancy ( RIGHT)

## 2022-01-25 NOTE — OB RN PATIENT PROFILE - NS_OBGYNHISTORY_OBGYN_ALL_OB_FT
TOP x1 w/ d&c   PCOS- Enocital  Abnormal pap w/ colpo  Hashimotos- Synthroid 112mcg  Echelan tubes placed as a child  COVID + (1/4/22)  Anxiety/depression- Escitalopram before pregnancy

## 2022-01-26 ENCOUNTER — NON-APPOINTMENT (OUTPATIENT)
Age: 32
End: 2022-01-26

## 2022-01-26 LAB
BASOPHILS # BLD AUTO: 0.24 K/UL — HIGH (ref 0–0.2)
BASOPHILS NFR BLD AUTO: 0.9 % — SIGNIFICANT CHANGE UP (ref 0–2)
DACRYOCYTES BLD QL SMEAR: SLIGHT — SIGNIFICANT CHANGE UP
EOSINOPHIL # BLD AUTO: 0 K/UL — SIGNIFICANT CHANGE UP (ref 0–0.5)
EOSINOPHIL NFR BLD AUTO: 0 % — SIGNIFICANT CHANGE UP (ref 0–6)
HCT VFR BLD CALC: 30.4 % — LOW (ref 34.5–45)
HGB BLD-MCNC: 10.1 G/DL — LOW (ref 11.5–15.5)
LYMPHOCYTES # BLD AUTO: 0.93 K/UL — LOW (ref 1–3.3)
LYMPHOCYTES # BLD AUTO: 3.5 % — LOW (ref 13–44)
MANUAL SMEAR VERIFICATION: SIGNIFICANT CHANGE UP
MCHC RBC-ENTMCNC: 27.7 PG — SIGNIFICANT CHANGE UP (ref 27–34)
MCHC RBC-ENTMCNC: 33.2 GM/DL — SIGNIFICANT CHANGE UP (ref 32–36)
MCV RBC AUTO: 83.3 FL — SIGNIFICANT CHANGE UP (ref 80–100)
MONOCYTES # BLD AUTO: 0.93 K/UL — HIGH (ref 0–0.9)
MONOCYTES NFR BLD AUTO: 3.5 % — SIGNIFICANT CHANGE UP (ref 2–14)
NEUTROPHILS # BLD AUTO: 24.53 K/UL — HIGH (ref 1.8–7.4)
NEUTROPHILS NFR BLD AUTO: 92.1 % — HIGH (ref 43–77)
PLAT MORPH BLD: NORMAL — SIGNIFICANT CHANGE UP
PLATELET # BLD AUTO: 248 K/UL — SIGNIFICANT CHANGE UP (ref 150–400)
RBC # BLD: 3.65 M/UL — LOW (ref 3.8–5.2)
RBC # FLD: 13.9 % — SIGNIFICANT CHANGE UP (ref 10.3–14.5)
RBC BLD AUTO: SIGNIFICANT CHANGE UP
WBC # BLD: 26.63 K/UL — HIGH (ref 3.8–10.5)
WBC # FLD AUTO: 26.63 K/UL — HIGH (ref 3.8–10.5)

## 2022-01-26 PROCEDURE — 88307 TISSUE EXAM BY PATHOLOGIST: CPT | Mod: 26

## 2022-01-26 PROCEDURE — 59400 OBSTETRICAL CARE: CPT | Mod: GC

## 2022-01-26 PROCEDURE — 93010 ELECTROCARDIOGRAM REPORT: CPT

## 2022-01-26 RX ORDER — OXYTOCIN 10 UNIT/ML
4 VIAL (ML) INJECTION
Qty: 30 | Refills: 0 | Status: DISCONTINUED | OUTPATIENT
Start: 2022-01-26 | End: 2022-01-28

## 2022-01-26 RX ORDER — ONDANSETRON 8 MG/1
4 TABLET, FILM COATED ORAL ONCE
Refills: 0 | Status: COMPLETED | OUTPATIENT
Start: 2022-01-26 | End: 2022-01-26

## 2022-01-26 RX ORDER — LEVOTHYROXINE SODIUM 125 MCG
112 TABLET ORAL DAILY
Refills: 0 | Status: DISCONTINUED | OUTPATIENT
Start: 2022-01-26 | End: 2022-01-28

## 2022-01-26 RX ORDER — SODIUM CHLORIDE 9 MG/ML
3 INJECTION INTRAMUSCULAR; INTRAVENOUS; SUBCUTANEOUS EVERY 8 HOURS
Refills: 0 | Status: DISCONTINUED | OUTPATIENT
Start: 2022-01-26 | End: 2022-01-28

## 2022-01-26 RX ORDER — DIPHENHYDRAMINE HCL 50 MG
25 CAPSULE ORAL EVERY 6 HOURS
Refills: 0 | Status: DISCONTINUED | OUTPATIENT
Start: 2022-01-26 | End: 2022-01-28

## 2022-01-26 RX ORDER — AER TRAVELER 0.5 G/1
1 SOLUTION RECTAL; TOPICAL EVERY 4 HOURS
Refills: 0 | Status: DISCONTINUED | OUTPATIENT
Start: 2022-01-26 | End: 2022-01-28

## 2022-01-26 RX ORDER — OXYTOCIN 10 UNIT/ML
333.33 VIAL (ML) INJECTION
Qty: 20 | Refills: 0 | Status: DISCONTINUED | OUTPATIENT
Start: 2022-01-26 | End: 2022-01-28

## 2022-01-26 RX ORDER — LANOLIN
1 OINTMENT (GRAM) TOPICAL EVERY 6 HOURS
Refills: 0 | Status: DISCONTINUED | OUTPATIENT
Start: 2022-01-26 | End: 2022-01-28

## 2022-01-26 RX ORDER — MAGNESIUM HYDROXIDE 400 MG/1
30 TABLET, CHEWABLE ORAL
Refills: 0 | Status: DISCONTINUED | OUTPATIENT
Start: 2022-01-26 | End: 2022-01-28

## 2022-01-26 RX ORDER — HYDROCORTISONE 1 %
1 OINTMENT (GRAM) TOPICAL EVERY 6 HOURS
Refills: 0 | Status: DISCONTINUED | OUTPATIENT
Start: 2022-01-26 | End: 2022-01-28

## 2022-01-26 RX ORDER — ACETAMINOPHEN 500 MG
975 TABLET ORAL
Refills: 0 | Status: DISCONTINUED | OUTPATIENT
Start: 2022-01-26 | End: 2022-01-28

## 2022-01-26 RX ORDER — TETANUS TOXOID, REDUCED DIPHTHERIA TOXOID AND ACELLULAR PERTUSSIS VACCINE, ADSORBED 5; 2.5; 8; 8; 2.5 [IU]/.5ML; [IU]/.5ML; UG/.5ML; UG/.5ML; UG/.5ML
0.5 SUSPENSION INTRAMUSCULAR ONCE
Refills: 0 | Status: DISCONTINUED | OUTPATIENT
Start: 2022-01-26 | End: 2022-01-28

## 2022-01-26 RX ORDER — OXYCODONE HYDROCHLORIDE 5 MG/1
5 TABLET ORAL
Refills: 0 | Status: DISCONTINUED | OUTPATIENT
Start: 2022-01-26 | End: 2022-01-28

## 2022-01-26 RX ORDER — PRAMOXINE HYDROCHLORIDE 150 MG/15G
1 AEROSOL, FOAM RECTAL EVERY 4 HOURS
Refills: 0 | Status: DISCONTINUED | OUTPATIENT
Start: 2022-01-26 | End: 2022-01-28

## 2022-01-26 RX ORDER — DIBUCAINE 1 %
1 OINTMENT (GRAM) RECTAL EVERY 6 HOURS
Refills: 0 | Status: DISCONTINUED | OUTPATIENT
Start: 2022-01-26 | End: 2022-01-28

## 2022-01-26 RX ORDER — IBUPROFEN 200 MG
600 TABLET ORAL EVERY 6 HOURS
Refills: 0 | Status: DISCONTINUED | OUTPATIENT
Start: 2022-01-26 | End: 2022-01-28

## 2022-01-26 RX ORDER — SODIUM CHLORIDE 9 MG/ML
1000 INJECTION, SOLUTION INTRAVENOUS ONCE
Refills: 0 | Status: COMPLETED | OUTPATIENT
Start: 2022-01-26 | End: 2022-01-26

## 2022-01-26 RX ORDER — IBUPROFEN 200 MG
600 TABLET ORAL EVERY 6 HOURS
Refills: 0 | Status: COMPLETED | OUTPATIENT
Start: 2022-01-26 | End: 2022-12-25

## 2022-01-26 RX ORDER — OXYCODONE HYDROCHLORIDE 5 MG/1
5 TABLET ORAL ONCE
Refills: 0 | Status: DISCONTINUED | OUTPATIENT
Start: 2022-01-26 | End: 2022-01-28

## 2022-01-26 RX ORDER — KETOROLAC TROMETHAMINE 30 MG/ML
30 SYRINGE (ML) INJECTION ONCE
Refills: 0 | Status: DISCONTINUED | OUTPATIENT
Start: 2022-01-26 | End: 2022-01-26

## 2022-01-26 RX ORDER — SIMETHICONE 80 MG/1
80 TABLET, CHEWABLE ORAL EVERY 4 HOURS
Refills: 0 | Status: DISCONTINUED | OUTPATIENT
Start: 2022-01-26 | End: 2022-01-28

## 2022-01-26 RX ORDER — BENZOCAINE 10 %
1 GEL (GRAM) MUCOUS MEMBRANE EVERY 6 HOURS
Refills: 0 | Status: DISCONTINUED | OUTPATIENT
Start: 2022-01-26 | End: 2022-01-28

## 2022-01-26 RX ADMIN — Medication 112 MICROGRAM(S): at 18:55

## 2022-01-26 RX ADMIN — SODIUM CHLORIDE 1000 MILLILITER(S): 9 INJECTION, SOLUTION INTRAVENOUS at 19:00

## 2022-01-26 RX ADMIN — Medication 4 MILLIUNIT(S)/MIN: at 03:15

## 2022-01-26 RX ADMIN — Medication 108 GRAM(S): at 03:15

## 2022-01-26 RX ADMIN — Medication 108 GRAM(S): at 07:30

## 2022-01-26 RX ADMIN — Medication 30 MILLIGRAM(S): at 18:20

## 2022-01-26 RX ADMIN — Medication 4 MILLIUNIT(S)/MIN: at 14:16

## 2022-01-26 RX ADMIN — ONDANSETRON 4 MILLIGRAM(S): 8 TABLET, FILM COATED ORAL at 14:15

## 2022-01-26 RX ADMIN — Medication 108 GRAM(S): at 11:56

## 2022-01-26 NOTE — OB PROVIDER LABOR PROGRESS NOTE - ASSESSMENT
30yo  @39w0d IOL for IUGR. On Amp for GBS prophylaxis.    Plan:  -Continue pit  -Anethesia called for pain control  -Peanut ball    Plan per Dr. Jett (MD)    Brittnee Marmolejo PA-C 
Multip induction @ 38+ 6  for AC < 10% on Pitocin  Discussed management recommendations  if no real cervical change will give cervical balloon to augment induction  pt agrees w plan and will get epidural, VE and balloon    will observe  discussed peanut ball for decent later in process  birth plan reviewed  and consent signed in office    PIERRE Buckner MD  attending
Primip induction @ 39 wks  for FGR (AC < 10 %ile)    Pitocin indiction s/p balloon  (2.5 cm to 5 cm)  stable cervical  after balloon removal  continue Pitocin plan  break at 24 hours and restart  restart peanut balloon for decent    decreased Pitocin, left lateral, for noted late decels since recent exam
Pt fully dilated and will start pushing. Cat 1 tracing, anticipate .    Johnie TANNER
Pt noted to have recurrent variable decels around 110pm. Dr. Wong at bedside as I was involved in another patien's care at the time. Pit turned from 16 to off with improvement in EFM. Given a pitocin break and now plan to restart pit and continue pushing. SVE 10/100/+1, + scalp stim which excludes the likelihood of fetal acidemia. Anticpate .    Nelson TANNER
A/P:  -EFM/[place]  -Continue pitocin  -Continue with ampicillin for gbs ppx  -Anticipate   D/w Dr. Dudley Lyons PA-C
OB attg note    32yo  at 38+6 IOL IUGR with EFW 2978g (24%) but AC 8% on . Hx of hypothyroid. Prolonged IOL, s/p cytotec and CB, now on pitocin of 20. AROMed for clear fluid on this exam, SVE /-2, IUPC placed for pitocin titration. Pt very motivated for vaginal delivery. Will continue pit, cat 1 tracing. GBS pos, on amp. Anticipate .    Nelson TANNER
32 y/o  @ 38.6 weeks admitted IOL for IUGR (8%)   -cervical balloon was removed  -will continue with pitocin for augmentation currently at 8 milliunits/min  -+GBS -> continue Ampicillin 1g Q4hrs  -will consider AROM with next exam-> peanut ball applied for fetal head descent    d/w Dr. Dudley Juarez NP

## 2022-01-26 NOTE — OB PROVIDER LABOR PROGRESS NOTE - NS_OBIHIFHRDETAILS_OBGYN_ALL_OB_FT
140/moderate variability/+accels/no decels
baseline 150, mod variability, + accels, no decels
140, moderate variability, + accels, no decels
125 moderate variability, + acels, -decels, category 1 tracing
Baseline 140, moderate variability, +accels, no decels, Cat I
135 moderate variability, + acels, -decels, category 1 tracing
135, moderate variability, + accels,  no decels  (prior lates after exam)

## 2022-01-26 NOTE — PROVIDER CONTACT NOTE (OTHER) - ACTION/TREATMENT ORDERED:
Pt given Toradol As ordered. Regular diet and hydration allowed.  Epidural catheter removed.  LAbs and EKG sent.  Pt to remain on L&D for further observation.

## 2022-01-26 NOTE — PROVIDER CONTACT NOTE (OTHER) - ASSESSMENT
fundus at umbilicus slightly deviated with moderate bleeding.  Straight catherized for 200 cc's  Pt denies any SOB or diffuculty breathing.

## 2022-01-26 NOTE — OB RN DELIVERY SUMMARY - NSSELHIDDEN_OBGYN_ALL_OB_FT
[NS_DeliveryAttending1_OBGYN_ALL_OB_FT:[id number]=],[NS_DeliveryRN_OBGYN_ALL_OB_FT:[id number]=],[NS_CirculateRN2_OBGYN_ALL_OB_FT:[id number]]

## 2022-01-26 NOTE — DISCUSSION/SUMMARY
[FreeTextEntry1] : 30yo P1 s/p   after IOL for IUGR with AC 8%. Nuchal and body cord noted. 2nd deg laceration repaired in usual fashion. EBL 300cc. Boy, apgars 9,9.

## 2022-01-26 NOTE — PROVIDER CONTACT NOTE (OTHER) - SITUATION
pt with elevated 's at 1720 made aware by VICENTA Guzman.  Pt remained on PP checks up tyo the bathroom and had an episode of dizzyness while on toilet.  Pt back to bed pulse ox applied.  -130

## 2022-01-26 NOTE — OB PROVIDER LABOR PROGRESS NOTE - NS_SUBJECTIVE/OBJECTIVE_OBGYN_ALL_OB_FT
Cervical balloon placement. 60mL instilled in uterine and vaginal balloons. The patient tolerated the procedure well
at the bedside for further labor management.
Patient c/o increased vaginal pain. 8/100/-1
pt feeling ctx's on Pitocin, s/p Cytotec dosing  + FM, -LOF, -VB    ICU Vital Signs Last 24 Hrs  T(C): 36.8 (25 Jan 2022 06:50), Max: 36.8 (25 Jan 2022 06:50)  HR: 94 (25 Jan 2022 09:05) (81 - 120)  BP: 108/69 (25 Jan 2022 06:50) (108/69 - 127/82)  RR: 18 (25 Jan 2022 06:50) (18 - 18)  SpO2: 100% (25 Jan 2022 09:05) (94% - 100%)                          11.3   15.89 )-----------( 280      ( 25 Jan 2022 04:03 )             34.2
no complaints    ICU Vital Signs Last 24 Hrs  T(C): 36.8 (25 Jan 2022 23:00), Max: 36.8 (25 Jan 2022 06:50)  HR: 83 (26 Jan 2022 00:23) (75 - 130)  BP: 95/52 (26 Jan 2022 00:25) (94/55 - 140/66)  RR: 18 (25 Jan 2022 19:10) (18 - 18)  SpO2: 97% (26 Jan 2022 00:23) (89% - 100%)
PA Note:  Patient seen and evaluated at bedside for VE. Patient comfortable with epidural in place.
comfortable w epid

## 2022-01-26 NOTE — OB PROVIDER DELIVERY SUMMARY - NSPROVIDERDELIVERYNOTE_OBGYN_ALL_OB_FT
Patient pushed effectively, head/shoulders/body delivered easily. Noted to have nuchal and body cord x 1, reduced after delivery. Delayed cord clamping performed, cord gases collected. Placenta delivered spontaneously after gentle traction on cord. Manual sweep confirmed no e/o rPOCs within uterus. Good uterine tone. 2nd deg laceration repaired with 2-0 [place]. Total EBL 300cc. Boy, apgars 9,9.

## 2022-01-26 NOTE — OB PROVIDER LABOR PROGRESS NOTE - NSVAGINALEXAM_OBGYN_ALL_OB_DT
25-Jan-2022 16:11
26-Jan-2022 09:00
26-Jan-2022 23:50
25-Jan-2022 21:38
25-Jan-2022 10:57
26-Jan-2022 10:50

## 2022-01-26 NOTE — OB RN DELIVERY SUMMARY - NS_SEPSISRSKCALC_OBGYN_ALL_OB_FT
EOS calculated successfully. EOS Risk Factor: 0.5/1000 live births ([facility] national incidence); GA=39w;Temp=98.6; ROM=5.783; GBS='Positive'; Antibiotics='GBS specific antibiotics > 2 hrs prior to birth'

## 2022-01-27 ENCOUNTER — TRANSCRIPTION ENCOUNTER (OUTPATIENT)
Age: 32
End: 2022-01-27

## 2022-01-27 ENCOUNTER — APPOINTMENT (OUTPATIENT)
Dept: ANTEPARTUM | Facility: CLINIC | Age: 32
End: 2022-01-27

## 2022-01-27 LAB
BACTERIA UR CULT: ABNORMAL
COVID-19 SPIKE DOMAIN AB INTERP: POSITIVE
COVID-19 SPIKE DOMAIN ANTIBODY RESULT: >250 U/ML — HIGH
SARS-COV-2 IGG+IGM SERPL QL IA: >250 U/ML — HIGH
SARS-COV-2 IGG+IGM SERPL QL IA: POSITIVE
TSH SERPL-MCNC: 0.56 UIU/ML — SIGNIFICANT CHANGE UP (ref 0.27–4.2)

## 2022-01-27 RX ADMIN — Medication 975 MILLIGRAM(S): at 11:04

## 2022-01-27 RX ADMIN — Medication 600 MILLIGRAM(S): at 12:25

## 2022-01-27 RX ADMIN — Medication 600 MILLIGRAM(S): at 19:05

## 2022-01-27 RX ADMIN — Medication 600 MILLIGRAM(S): at 11:53

## 2022-01-27 RX ADMIN — Medication 600 MILLIGRAM(S): at 18:35

## 2022-01-27 RX ADMIN — Medication 975 MILLIGRAM(S): at 17:25

## 2022-01-27 RX ADMIN — Medication 975 MILLIGRAM(S): at 04:34

## 2022-01-27 RX ADMIN — Medication 975 MILLIGRAM(S): at 22:47

## 2022-01-27 RX ADMIN — Medication 975 MILLIGRAM(S): at 11:35

## 2022-01-27 RX ADMIN — Medication 600 MILLIGRAM(S): at 23:59

## 2022-01-27 RX ADMIN — Medication 975 MILLIGRAM(S): at 05:05

## 2022-01-27 RX ADMIN — Medication 600 MILLIGRAM(S): at 23:11

## 2022-01-27 RX ADMIN — Medication 1 TABLET(S): at 11:53

## 2022-01-27 RX ADMIN — Medication 975 MILLIGRAM(S): at 17:55

## 2022-01-27 RX ADMIN — Medication 975 MILLIGRAM(S): at 22:03

## 2022-01-27 NOTE — DISCHARGE NOTE OB - NS MD DC FALL RISK RISK
For information on Fall & Injury Prevention, visit: [email or web address] OR  [email or web address] OR  https://www.cdc.gov/steadi/patient.html

## 2022-01-27 NOTE — DISCHARGE NOTE OB - MEDICATION SUMMARY - MEDICATIONS TO TAKE
I will START or STAY ON the medications listed below when I get home from the hospital:    acetaminophen 325 mg oral tablet  -- 3 tab(s) by mouth every 6 hours  -- Indication: For pain    ibuprofen 600 mg oral tablet  -- 1 tab(s) by mouth every 6 hours  -- Indication: For pain    Prenatal Multivitamins with Folic Acid 1 mg oral tablet  -- 1 tab(s) by mouth once a day  -- Indication: For breastfeeding    Synthroid 112 mcg (0.112 mg) oral tablet  -- 1 tab(s) by mouth once a day  -- Indication: For hypothroid

## 2022-01-27 NOTE — DISCHARGE NOTE OB - HOSPITAL COURSE
Pt admitted w term pregnancy.  She underwent  w no complications.  She was discharged home on PPD 2.

## 2022-01-27 NOTE — DISCHARGE NOTE OB - CARE PLAN
1 Principal Discharge DX:	Normal delivery  Assessment and plan of treatment:	Nothing in vagina x 6 weeks  Follow up in office in 4 weeks

## 2022-01-27 NOTE — DISCHARGE NOTE OB - CARE PROVIDER_API CALL
Pily Buckner)  OBSN  General  [address] [address]  Phone: [phone]  Fax: [phone]  Follow Up Time:

## 2022-01-27 NOTE — DISCHARGE NOTE OB - PATIENT PORTAL LINK FT
You can access the FollowMyHealth Patient Portal offered by [facility] by registering at the following website: [email or web address]. By joining [email or web address] FollowMyHealth portal, you will also be able to view your health information using other applications (apps) compatible with our system.

## 2022-01-27 NOTE — DISCHARGE NOTE OB - MATERIALS PROVIDED
[facility] [place] Screening Program/Breastfeeding Log/Breastfeeding Mother’s Support Group Information/Guide to Postpartum Care/[facility] Hearing Screen Program/Breastfeeding Guide and Packet/MMR Vaccination (VIS Pub Date: 2012) Vaccinations/[facility]  Screening Program/  Immunization Record/Breastfeeding Log/Bottle Feeding Log/Breastfeeding Mother’s Support Group Information/Guide to Postpartum Care/[facility] Hearing Screen Program/Back To Sleep Handout/Shaken Baby Prevention Handout/Breastfeeding Guide and Packet/Birth Certificate Instructions/MMR Vaccination (VIS Pub Date: 2012)

## 2022-01-28 VITALS
HEART RATE: 63 BPM | DIASTOLIC BLOOD PRESSURE: 67 MMHG | SYSTOLIC BLOOD PRESSURE: 95 MMHG | OXYGEN SATURATION: 97 % | RESPIRATION RATE: 17 BRPM | TEMPERATURE: 97 F

## 2022-01-28 PROCEDURE — 59025 FETAL NON-STRESS TEST: CPT

## 2022-01-28 PROCEDURE — 36415 COLL VENOUS BLD VENIPUNCTURE: CPT

## 2022-01-28 PROCEDURE — 93005 ELECTROCARDIOGRAM TRACING: CPT

## 2022-01-28 PROCEDURE — 86769 SARS-COV-2 COVID-19 ANTIBODY: CPT

## 2022-01-28 PROCEDURE — 85025 COMPLETE CBC W/AUTO DIFF WBC: CPT

## 2022-01-28 PROCEDURE — 86780 TREPONEMA PALLIDUM: CPT

## 2022-01-28 PROCEDURE — 86901 BLOOD TYPING SEROLOGIC RH(D): CPT

## 2022-01-28 PROCEDURE — 84443 ASSAY THYROID STIM HORMONE: CPT

## 2022-01-28 PROCEDURE — 90707 MMR VACCINE SC: CPT

## 2022-01-28 PROCEDURE — 59050 FETAL MONITOR W/REPORT: CPT

## 2022-01-28 PROCEDURE — 88307 TISSUE EXAM BY PATHOLOGIST: CPT

## 2022-01-28 PROCEDURE — 86850 RBC ANTIBODY SCREEN: CPT

## 2022-01-28 PROCEDURE — 86900 BLOOD TYPING SEROLOGIC ABO: CPT

## 2022-01-28 RX ORDER — ACETAMINOPHEN 500 MG
3 TABLET ORAL
Qty: 0 | Refills: 0 | DISCHARGE
Start: 2022-01-28

## 2022-01-28 RX ORDER — IBUPROFEN 200 MG
1 TABLET ORAL
Qty: 0 | Refills: 0 | DISCHARGE
Start: 2022-01-28

## 2022-01-28 RX ORDER — LEVOTHYROXINE SODIUM 125 MCG
1 TABLET ORAL
Qty: 0 | Refills: 0 | DISCHARGE

## 2022-01-28 RX ADMIN — Medication 975 MILLIGRAM(S): at 11:35

## 2022-01-28 RX ADMIN — Medication 600 MILLIGRAM(S): at 06:32

## 2022-01-28 RX ADMIN — Medication 112 MICROGRAM(S): at 06:01

## 2022-01-28 RX ADMIN — Medication 975 MILLIGRAM(S): at 04:54

## 2022-01-28 RX ADMIN — Medication 975 MILLIGRAM(S): at 05:25

## 2022-01-28 RX ADMIN — Medication 600 MILLIGRAM(S): at 06:01

## 2022-01-28 RX ADMIN — Medication 0.5 MILLILITER(S): at 10:51

## 2022-01-28 NOTE — PROGRESS NOTE ADULT - ASSESSMENT
D/C home-  Instructions given-  F/U 4wks  Cont synthroid
A:  32YO  PPD#1 s/p  with 2nd degree laceration  - Pt is well appearing & states she is doing well physically   - PMHX Anxiety/Depression - discussed with patient her emotional state after delivery. Denies feelings of anxiety/depression after delviery. Denies SI/HI/Thoughts of harming baby      P:  - Counseled to continue OOB as much as tolerable  - Regular diet   - PO Pain protocol  - Routine PP care  - Continue seeing therapist for anxiety/depression mgmt PP

## 2022-01-28 NOTE — PROGRESS NOTE ADULT - SUBJECTIVE AND OBJECTIVE BOX
Postpartum Note - PPD#1    Prenatal Labs  - Blood Type: O+  - Rubella IgG: Immune  - RPR: Negative    S: Pt reports pain at site of 2nd degree laceration repair. States she was given motrin by nursing staff on unit last night which provided some relief.  +OOB, Tolerating PO, +Voiding Urine, +Flatus. Lochia WNL.     Vital Signs Last 24 Hrs  T(C): 36.8 (27 Jan 2022 05:33), Max: 36.9 (26 Jan 2022 11:00)  T(F): 98.2 (27 Jan 2022 05:33), Max: 98.42 (26 Jan 2022 11:00)  HR: 105 (27 Jan 2022 05:33) (61 - 190)  BP: 114/78 (27 Jan 2022 05:33) (90/54 - 162/114)  BP(mean): --  RR: 18 (27 Jan 2022 05:33) (18 - 18)  SpO2: 98% (27 Jan 2022 05:33) (77% - 100%)    Gen: Resting comfortably in bed. Well appearing, NAD.  Abdomen: Soft, nontender, nondistended. Fundus palpated & firm.   Vaginal: Lochia WNL   Ext: Neg calf tenderness/swelling    
Pt w no complaints     +OOB      +jarod reg  VSS   Fundus firm

## 2022-01-31 ENCOUNTER — NON-APPOINTMENT (OUTPATIENT)
Age: 32
End: 2022-01-31

## 2022-01-31 ENCOUNTER — APPOINTMENT (OUTPATIENT)
Dept: ANTEPARTUM | Facility: CLINIC | Age: 32
End: 2022-01-31

## 2022-02-01 ENCOUNTER — APPOINTMENT (OUTPATIENT)
Dept: OBGYN | Facility: CLINIC | Age: 32
End: 2022-02-01

## 2022-02-03 ENCOUNTER — APPOINTMENT (OUTPATIENT)
Dept: ANTEPARTUM | Facility: CLINIC | Age: 32
End: 2022-02-03

## 2022-02-04 ENCOUNTER — NON-APPOINTMENT (OUTPATIENT)
Age: 32
End: 2022-02-04

## 2022-02-17 ENCOUNTER — NON-APPOINTMENT (OUTPATIENT)
Age: 32
End: 2022-02-17

## 2022-03-10 ENCOUNTER — NON-APPOINTMENT (OUTPATIENT)
Age: 32
End: 2022-03-10

## 2022-03-16 DIAGNOSIS — N64.53 RETRACTION OF NIPPLE: ICD-10-CM

## 2022-03-24 ENCOUNTER — APPOINTMENT (OUTPATIENT)
Dept: OBGYN | Facility: CLINIC | Age: 32
End: 2022-03-24
Payer: COMMERCIAL

## 2022-03-24 VITALS
DIASTOLIC BLOOD PRESSURE: 87 MMHG | BODY MASS INDEX: 34.1 KG/M2 | HEIGHT: 68 IN | WEIGHT: 225 LBS | SYSTOLIC BLOOD PRESSURE: 126 MMHG

## 2022-03-24 DIAGNOSIS — F41.8 OTHER MENTAL DISORDERS COMPLICATING THE PUERPERIUM: ICD-10-CM

## 2022-03-24 DIAGNOSIS — Z34.92 ENCOUNTER FOR SUPERVISION OF NORMAL PREGNANCY, UNSPECIFIED, SECOND TRIMESTER: ICD-10-CM

## 2022-03-24 DIAGNOSIS — Z34.90 ENCOUNTER FOR SUPERVISION OF NORMAL PREGNANCY, UNSPECIFIED, UNSPECIFIED TRIMESTER: ICD-10-CM

## 2022-03-24 DIAGNOSIS — Z01.419 ENCOUNTER FOR GYNECOLOGICAL EXAMINATION (GENERAL) (ROUTINE) W/OUT ABNORMAL FINDINGS: ICD-10-CM

## 2022-03-24 PROCEDURE — 0503F POSTPARTUM CARE VISIT: CPT

## 2022-03-24 NOTE — HISTORY OF PRESENT ILLNESS
[Postpartum Follow Up] : postpartum follow up [Delivery Date: ___] : on [unfilled] [Male] : Delivery History: baby boy [Wt. ___] : weighing [unfilled] [Breastfeeding] : currently nursing [BF with Difficulty] : nursing with difficulty [Resumed Menses] : has resumed her menses [Intended Contraception] : Intended Contraception: [Resumed [place]] : has not resumed intercourse [Condoms] : condoms [S/Sx PP Depression] : signs/symptoms of postpartum depression [Suicidal Ideation] : no suicidal ideation [Fatigue] : fatigue [Dysuria] : no dysuria [Fever] : no fever [Headache] : no headache [Back to Normal] : is back to normal in size [None] : no vaginal bleeding [Normal] : the vagina was normal [Healing Well] : is healing well [Cervix Sample Taken] : cervical sample not taken for a Pap smear [Examination Of The Breasts] : breasts are normal [Doing Well] : is doing well [No Sign of Infection] : is showing no signs of infection [No [place]] : to avoid sexual intercourse [de-identified] : 39wk induction [de-identified] : low volume, pumping and direct breat [de-identified] : stressed and tearful, seeing therapist. Discussing medication. Will discuss support w  to alleiviate stress and anxiety.

## 2022-09-30 ENCOUNTER — APPOINTMENT (OUTPATIENT)
Dept: OBGYN | Facility: CLINIC | Age: 32
End: 2022-09-30

## 2022-12-07 ENCOUNTER — NON-APPOINTMENT (OUTPATIENT)
Age: 32
End: 2022-12-07

## 2022-12-08 ENCOUNTER — APPOINTMENT (OUTPATIENT)
Dept: OBGYN | Facility: CLINIC | Age: 32
End: 2022-12-08

## 2022-12-08 VITALS — BODY MASS INDEX: 34.06 KG/M2 | SYSTOLIC BLOOD PRESSURE: 124 MMHG | DIASTOLIC BLOOD PRESSURE: 91 MMHG | WEIGHT: 224 LBS

## 2022-12-08 DIAGNOSIS — R03.0 ELEVATED BLOOD-PRESSURE READING, W/OUT DIAGNOSIS OF HYPERTENSION: ICD-10-CM

## 2022-12-08 PROCEDURE — 99213 OFFICE O/P EST LOW 20 MIN: CPT

## 2022-12-08 NOTE — REVIEW OF SYSTEMS
[Abdominal Pain] : abdominal pain [Vomiting] : no vomiting [Melena] : no melena [Bloating] : bloating [Negative] : Heme/Lymph

## 2022-12-08 NOTE — PLAN
[FreeTextEntry1] : abnormal CT or pelvic\par r/o endo\par suspect possible small fibroids\par \par MRI pelvic pending\par \par differential is mostly benign as above\par --gyn plan based on imaging\par continue GI work\par \par 25  min

## 2022-12-08 NOTE — PHYSICAL EXAM
[Chaperone Declined] : Patient declined chaperone [Appropriately responsive] : appropriately responsive [No Acute Distress] : no acute distress [Oriented x3] : oriented x3

## 2022-12-16 ENCOUNTER — NON-APPOINTMENT (OUTPATIENT)
Age: 32
End: 2022-12-16

## 2022-12-27 ENCOUNTER — APPOINTMENT (OUTPATIENT)
Dept: OBGYN | Facility: CLINIC | Age: 32
End: 2022-12-27

## 2023-01-04 ENCOUNTER — NON-APPOINTMENT (OUTPATIENT)
Age: 33
End: 2023-01-04

## 2023-01-04 ENCOUNTER — APPOINTMENT (OUTPATIENT)
Dept: GYNECOLOGIC ONCOLOGY | Facility: CLINIC | Age: 33
End: 2023-01-04
Payer: COMMERCIAL

## 2023-01-04 ENCOUNTER — FORM ENCOUNTER (OUTPATIENT)
Age: 33
End: 2023-01-04

## 2023-01-04 VITALS
SYSTOLIC BLOOD PRESSURE: 110 MMHG | BODY MASS INDEX: 36 KG/M2 | DIASTOLIC BLOOD PRESSURE: 78 MMHG | HEIGHT: 66 IN | WEIGHT: 224 LBS

## 2023-01-04 DIAGNOSIS — U07.1 COVID-19: ICD-10-CM

## 2023-01-04 DIAGNOSIS — Z78.9 OTHER SPECIFIED HEALTH STATUS: ICD-10-CM

## 2023-01-04 PROCEDURE — 99214 OFFICE O/P EST MOD 30 MIN: CPT

## 2023-01-04 PROCEDURE — 99204 OFFICE O/P NEW MOD 45 MIN: CPT

## 2023-01-06 ENCOUNTER — NON-APPOINTMENT (OUTPATIENT)
Age: 33
End: 2023-01-06

## 2023-01-06 PROBLEM — U07.1 COVID-19 VIRUS INFECTION: Status: RESOLVED | Noted: 2022-01-04 | Resolved: 2023-01-06

## 2023-01-06 PROBLEM — Z78.9 DOES NOT USE TOBACCO: Status: ACTIVE | Noted: 2023-01-06

## 2023-01-06 RX ORDER — LEVOTHYROXINE SODIUM 50 MCG
50 TABLET ORAL
Refills: 0 | Status: DISCONTINUED | COMMUNITY
End: 2023-01-06

## 2023-01-06 RX ORDER — METOCLOPRAMIDE 10 MG/1
10 TABLET ORAL 3 TIMES DAILY
Qty: 21 | Refills: 0 | Status: DISCONTINUED | COMMUNITY
Start: 2022-03-29 | End: 2023-01-06

## 2023-01-06 RX ORDER — LEVOTHYROXINE SODIUM 0.11 MG/1
112 TABLET ORAL
Refills: 0 | Status: ACTIVE | COMMUNITY

## 2023-01-06 NOTE — DISCUSSION/SUMMARY
[Reviewed Radiology Report(s)] : Radiology reports were reviewed. [Discuss Alternatives/Risks/Benefits w/Patient] : All alternatives, risks, and benefits were discussed with the patient/family and all questions were answered.  Patient expressed good understanding and appreciates the importance of follow up as recommended. [Reviewed Radiology Film/Image(s)] : Images from radiology studies were reviewed and examined.

## 2023-01-20 NOTE — CHIEF COMPLAINT
[FreeTextEntry1] : [facility] \par [address] \[place] [address]\par \par Abnormal pelvic MRI

## 2023-01-20 NOTE — END OF VISIT
[FreeTextEntry3] : This note was written by Leona Urias, acting as a scribe for Dr. Candy Becerril.\par This note accurately reflects the work and decisions made by me.\par

## 2023-01-20 NOTE — HISTORY OF PRESENT ILLNESS
[FreeTextEntry1] : 31yo  via  LMP 23 referred by Dr. Pily Buckner for evaluation of cystic structures on uterine serosa. Patient endorses bloating and abdominal discomfort over the last 6 months. She also endorses diarrhea over the last 3 months. During her work up for these symptoms, Dr. Sulaiman Conde (GI) ordered CT A/P and MRI pelvis in 2022 (results below). Results for both mention several small cystic lesions near right ovary & reactive LNs- discussion of endometriosis versus mesothelial cysts. Patient notes her menstrual cycle is irregular. She admits to 1-2 days of heavy bleeding, requiring 6 pads daily. She also endorses 2 days of cramping during her period, for which she takes Tylenol and uses heating pads. The pain has never been significant enough to miss work or need assistance providing . She denies dyspareunia. However, she does note that prior to her pregnancy in , she did not experience painful periods. She is currently still breast feeding. She denies recent tuberculosis exposure or recent travel. Denies unintentional weight loss, excessive gas, dyspepsia, bleeding, change in bladder or bowel habits, hematochezia. \par \par CT A/P 22: Tiny ovoid fluid density foci in the vesicoureteral space bilaterally and along the serosal surface of the uterine fundus of uncertain significance, differential diagnosis of endometriosis, clustered subcentimeter mesenteric and ileocolic LN's measuring up to 1cm nonspecific and presumably reactive\par \par MRI pelvis 22: Uterus 8.8 x 4.5 x 6.4cm, several small cystic lesions with the largest anterior to the right ovary measuring 1.2 x 1.0 x 1.9cm corresponding to CT findings. Signal characteristics are not suggestive of endometriosis, may represent mesothelial cysts but are not entirely specific, no appendiceal thickening, no enlarged LN's.\par \par Of note, patient's family planning goals include 2 more children- one natural birth & one adoption. Ideally, she would like children ~3 years apart.\par \par Last pap: 21- WNL, HPV neg \par Last EGD/colo: 2022- Polpys; inflammation- as per patient

## 2023-01-20 NOTE — PHYSICAL EXAM
[Chaperone Present] : A chaperone was present in the examining room during all aspects of the physical examination [Normal] : Bimanual Exam: Normal [FreeTextEntry1] : Name of chaperone: Leona Urias PA-C. [de-identified] : blood noted secondary to menses [de-identified] : Defer rectal examination.

## 2023-01-20 NOTE — REVIEW OF SYSTEMS
[Negative] : Musculoskeletal [Diarrhea] : diarrhea [Nausea] : no nausea/vomitting [de-identified] : bloating, abdominal discomfort

## 2023-01-20 NOTE — ASSESSMENT
[FreeTextEntry1] : 31 yo female with bloating, abdominal pain, and diarrhea x6 months, now with abnormal MRI imaging.\par \par After speaking with the patient and reviewing her images, I do not feel that the findings are enough to warrant surgical intervention at this time. We discussed the possibility of diagnostic laparoscopy with biopsies; however, I feel it is more appropriate to repeat MRI imaging in 3-4 months for re-evaluation of the abnormal lesions seen. I am not concerned about cancer at this time, especially given the lack of diffuse cystic findings. I will also have a [facility] radiologist review images to rule in or out endometriosis, as well as assess his level of suspicion for any pathology. Regarding family planning, I encouraged the patient to use protection in order to avoid pregnancy while she is undergoing this work up. I also highly encouraged her to follow up with her gastroenterologist after our visit together. We will meet after her repeat imaging in 3-4 months, unless her symptoms change. She is aware to contact our office sooner should symptoms worsen.\par \par All questions and concerns were answered to patient's apparent satisfaction.

## 2023-02-07 ENCOUNTER — NON-APPOINTMENT (OUTPATIENT)
Age: 33
End: 2023-02-07

## 2023-02-07 DIAGNOSIS — N93.9 ABNORMAL UTERINE AND VAGINAL BLEEDING, UNSPECIFIED: ICD-10-CM

## 2023-02-27 ENCOUNTER — RESULT REVIEW (OUTPATIENT)
Age: 33
End: 2023-02-27

## 2023-03-24 DIAGNOSIS — F41.9 ANXIETY DISORDER, UNSPECIFIED: ICD-10-CM

## 2023-03-24 RX ORDER — ALPRAZOLAM 0.5 MG/1
0.5 TABLET ORAL
Qty: 2 | Refills: 0 | Status: ACTIVE | COMMUNITY
Start: 2023-03-24 | End: 1900-01-01

## 2023-04-02 ENCOUNTER — APPOINTMENT (OUTPATIENT)
Dept: MRI IMAGING | Facility: IMAGING CENTER | Age: 33
End: 2023-04-02
Payer: COMMERCIAL

## 2023-04-02 ENCOUNTER — OUTPATIENT (OUTPATIENT)
Dept: OUTPATIENT SERVICES | Facility: HOSPITAL | Age: 33
LOS: 1 days | End: 2023-04-02
Payer: COMMERCIAL

## 2023-04-02 DIAGNOSIS — R93.5 ABNORMAL FINDINGS ON DIAGNOSTIC IMAGING OF OTHER ABDOMINAL REGIONS, INCLUDING RETROPERITONEUM: ICD-10-CM

## 2023-04-02 PROCEDURE — 72197 MRI PELVIS W/O & W/DYE: CPT | Mod: 26

## 2023-04-02 PROCEDURE — 74183 MRI ABD W/O CNTR FLWD CNTR: CPT

## 2023-04-02 PROCEDURE — A9585: CPT

## 2023-04-02 PROCEDURE — 72197 MRI PELVIS W/O & W/DYE: CPT

## 2023-04-02 PROCEDURE — 74183 MRI ABD W/O CNTR FLWD CNTR: CPT | Mod: 26

## 2023-05-04 ENCOUNTER — APPOINTMENT (OUTPATIENT)
Dept: GYNECOLOGIC ONCOLOGY | Facility: CLINIC | Age: 33
End: 2023-05-04
Payer: COMMERCIAL

## 2023-05-05 ENCOUNTER — APPOINTMENT (OUTPATIENT)
Dept: GYNECOLOGIC ONCOLOGY | Facility: CLINIC | Age: 33
End: 2023-05-05
Payer: COMMERCIAL

## 2023-05-05 DIAGNOSIS — R93.5 ABNORMAL FINDINGS ON DIAGNOSTIC IMAGING OF OTHER ABDOMINAL REGIONS, INCLUDING RETROPERITONEUM: ICD-10-CM

## 2023-05-05 PROCEDURE — 99213 OFFICE O/P EST LOW 20 MIN: CPT | Mod: 95

## 2023-05-09 PROBLEM — R93.5 ABNORMAL MRI, PELVIS: Status: ACTIVE | Noted: 2023-01-04

## 2023-05-10 NOTE — OB PROVIDER H&P - NSHPPHYSICALEXAM_GEN_ALL_CORE
VS:  Vital Signs Last 24 Hrs  T(C): --  T(F): --  HR: 108 (25 Jan 2022 01:59) (91 - 120)  BP: 116/78 (25 Jan 2022 01:03) (116/78 - 116/78)  BP(mean): --  RR: --  SpO2: 99% (25 Jan 2022 01:59) (99% - 100%)  GA: NAD  Cards: RRR  Pulm: CTAB  EFH: 130/mod/+accels/-decels  [place]: not brandt  VE: 2.5/60/-3  TAUS: vertex Star Wedge Flap Text: The defect edges were debeveled with a #15 scalpel blade.  Given the location of the defect, shape of the defect and the proximity to free margins a star wedge flap was deemed most appropriate.  Using a sterile surgical marker, an appropriate rotation flap was drawn incorporating the defect and placing the expected incisions within the relaxed skin tension lines where possible. The area thus outlined was incised deep to adipose tissue with a #15 scalpel blade.  The skin margins were undermined to an appropriate distance in all directions utilizing iris scissors.

## 2023-05-23 NOTE — REASON FOR VISIT
[Home] : at home, [unfilled] , at the time of the visit. [Medical Office: ([facility])___] : at the medical office located in  [Spouse] : spouse [FreeTextEntry1] : Abnormal pelvic MRI \par \par Review recent MRI abd/pelv

## 2023-05-23 NOTE — END OF VISIT
[Time Spent: ___ minutes] : I have spent [unfilled] minutes of time on the encounter. [FreeTextEntry3] : Written by Priscilla Oseguera, acting as a scribe for Dr. Candy Azevedo This note accurately reflects the work and decisions made by me.

## 2023-05-23 NOTE — HISTORY OF PRESENT ILLNESS
[FreeTextEntry1] : 31 yo female  was last seen in January with bloating, abdominal pain, and diarrhea x6 months, now with abnormal MRI imaging.\par \par After speaking with the patient and reviewing her images, I did not feel that the findings were enough to warrant surgical intervention at the time. We discussed the possibility of diagnostic laparoscopy with biopsies; however, I feel it is more appropriate to repeat MRI imaging in 3-4 months for re-evaluation of the abnormal lesions seen. I was not concerned about cancer at the time, especially given the lack of diffuse cystic findings. I will also have a [facility] radiologist review images to rule in or out endometriosis, as well as assess his level of suspicion for any pathology. Regarding family planning, I encouraged the patient to use protection in order to avoid pregnancy while she is undergoing this work up. I also highly encouraged her to follow up with her gastroenterologist after our visit together. We will meet after her repeat imaging in 3-4 months, unless her symptoms change. She was aware to contact our office sooner should symptoms worsen.\par \par She has a telehealth visit today to discuss results \par \par MRI ABD/PELV 4/2/23 IMPRESSION:\par *  No evidence of endometriosis or adenomyosis. No endometrioma. No hemorrhagic peritoneal implants.\par *  Stable small cystic structures anterior to the right and left ovary and anterior to the uterus measuring up to 1.1 cm.\par \par

## 2023-05-23 NOTE — ASSESSMENT
[FreeTextEntry1] : I previously discussed with patient that Dudley Peoples just wanted to be cautious because findings on MRI were unclear. \par I reviewed her MRI results which were reassuring, there were no changes and appears stable. Patient reports bloating is still the same, GI wanted to consult with her after this appt. Patient attributed bloating to diet her diet, However she does not believe it is dietary because she changed tried changing her diet and had no improvement. Patient reports she had antibodies for celiacs disease. If it is celiac disease it is likely still early. They are still trying to figure it out. \par \par Patient had endoscopy December 7th as well as Colonoscopy December 21st Patient reports she has not discussed results with GI. I briefly discussed results with patient which appeared to be wnl, some gastritis. Advised her that the abdominal part of her MRI also looked okay. There was nothing to suggest why she is having bloating. Patient reports weight is about the same. To me benign imaging studies. do not recommend difference in care plan. No surgical intervention at this time. If for any reason something has worsen over time recommended patient follow up with me. If persistent symptoms and cant find anything, would recommend laparoscopy. She is at a low risk of any worrisome findings given there is no strong family hx of cancer therefore would not recommend laparoscopy at this time. Also discussed that there was no evidence of endometriosis which is reassuring as well. \par \par Will advised my FD team  to send note to GI as well as imaging.

## 2023-05-23 NOTE — PHYSICAL EXAM
[Normal] : Mood and affect: Normal [FreeTextEntry1] : Priscilla Oseguera Medical assistant was present during Telehealth visit

## 2023-08-24 ENCOUNTER — APPOINTMENT (OUTPATIENT)
Dept: OBGYN | Facility: CLINIC | Age: 33
End: 2023-08-24
Payer: COMMERCIAL

## 2023-08-24 VITALS
HEIGHT: 66 IN | BODY MASS INDEX: 36.32 KG/M2 | DIASTOLIC BLOOD PRESSURE: 79 MMHG | WEIGHT: 226 LBS | SYSTOLIC BLOOD PRESSURE: 115 MMHG

## 2023-08-24 DIAGNOSIS — N92.0 EXCESSIVE AND FREQUENT MENSTRUATION WITH REGULAR CYCLE: ICD-10-CM

## 2023-08-24 DIAGNOSIS — Z01.419 ENCOUNTER FOR GYNECOLOGICAL EXAMINATION (GENERAL) (ROUTINE) W/OUT ABNORMAL FINDINGS: ICD-10-CM

## 2023-08-24 LAB
HCG SERPL-MCNC: <1 MIU/ML
T4 FREE SERPL-MCNC: 1 NG/DL
TSH SERPL-ACNC: 2.98 UIU/ML

## 2023-08-24 PROCEDURE — 99395 PREV VISIT EST AGE 18-39: CPT

## 2023-08-24 NOTE — HISTORY OF PRESENT ILLNESS
[FreeTextEntry1] : LMP 8  33yo P1 here for wellness. Had heavy period with last menses over 4 days, heavy on CD1-3 with sx of dizziness and fatigue. Used 5-6ppd. Sometimes feels phantom baby movements, unsure if wants to TTC. Currently on synhroid 125mcg and seeing endo for possible uptitration.   Follows with gyn onc for pelvic masses found during GI workup. CT 2022 showed 3 subserosal masses, ddx endometriosis, subCM LNs presumably reactive. MRI 2022 showed several cystic lesions may be mesothelial cysts not suggestive of endometriosis. Had repeat imaging 2023 that showed stable structures, was recommended for conservative management  HCM - pap 2021 nilm/hpv neg  POB:  2022 after IOL for IUGR

## 2023-08-24 NOTE — PLAN
[FreeTextEntry1] : Pap UTD, will check CBC and thyroid labs and HCG per pt request. Discussed if wants to TTC to start PNV. Will continue monitoring cycles, if persistently heavy will obtain sono.

## 2023-08-25 ENCOUNTER — TRANSCRIPTION ENCOUNTER (OUTPATIENT)
Age: 33
End: 2023-08-25

## 2023-08-25 LAB
C TRACH RRNA SPEC QL NAA+PROBE: NOT DETECTED
N GONORRHOEA RRNA SPEC QL NAA+PROBE: NOT DETECTED
SOURCE AMPLIFICATION: NORMAL

## 2024-02-23 ENCOUNTER — APPOINTMENT (OUTPATIENT)
Dept: OBGYN | Facility: CLINIC | Age: 34
End: 2024-02-23
Payer: COMMERCIAL

## 2024-02-23 VITALS — BODY MASS INDEX: 35.15 KG/M2 | DIASTOLIC BLOOD PRESSURE: 78 MMHG | WEIGHT: 217.8 LBS | SYSTOLIC BLOOD PRESSURE: 110 MMHG

## 2024-02-23 DIAGNOSIS — R93.89 ABNORMAL FINDINGS ON DIAGNOSTIC IMAGING OF OTHER SPECIFIED BODY STRUCTURES: ICD-10-CM

## 2024-02-23 DIAGNOSIS — N90.7 VULVAR CYST: ICD-10-CM

## 2024-02-23 PROCEDURE — 99213 OFFICE O/P EST LOW 20 MIN: CPT

## 2024-02-23 NOTE — PLAN
[FreeTextEntry1] : Appears vulvar lump has improved. Feels like has not decreased with abdominal girth despite weight loss, will obtain sono to f/u pelvic masses.

## 2024-02-23 NOTE — PHYSICAL EXAM
[Labia Majora] : normal [Labia Minora] : normal [Normal] : normal [Uterine Adnexae] : normal [FreeTextEntry1] : small 6mm nodule in L labia,  non tender

## 2024-02-23 NOTE — HISTORY OF PRESENT ILLNESS
[FreeTextEntry1] : LMP  32yo P1 here for L labial bump and concern that she has had difficulty with weight loss. Currently on synhroid and seeing endo and GI but feels like she has had difficulty with decreasing abdominal girth. L labial bump became tender and erythematous two days ago but since then has improved.  Follows with gyn onc for pelvic masses found during GI workup. CT 2022 showed 3 subserosal masses, ddx endometriosis, subCM LNs presumably reactive. MRI 2022 showed several cystic lesions may be mesothelial cysts not suggestive of endometriosis. Had repeat imaging 2023 that showed stable structures, was recommended for conservative management  HCM - pap 2021 nilm/hpv neg  POB:  2022 after IOL for IUGR

## 2024-02-28 ENCOUNTER — TRANSCRIPTION ENCOUNTER (OUTPATIENT)
Age: 34
End: 2024-02-28

## 2024-02-28 ENCOUNTER — APPOINTMENT (OUTPATIENT)
Dept: ULTRASOUND IMAGING | Facility: CLINIC | Age: 34
End: 2024-02-28
Payer: COMMERCIAL

## 2024-02-28 PROCEDURE — 76856 US EXAM PELVIC COMPLETE: CPT | Mod: 59

## 2024-02-28 PROCEDURE — 76830 TRANSVAGINAL US NON-OB: CPT

## 2024-06-11 NOTE — HISTORY OF PRESENT ILLNESS
[FreeTextEntry1] : 31yo  LMP here for f/u abnormal pelvic imaging\par Pt seeing GI for bloating and pain s/p upper endoscopy and CT scann\par \par CT :  3 uterine  subserosal masses at fundus and vesicouterine space, no pelvic lymphadenopathy \par r/o endometriosis\par MRI pending\par \par pt reports menses more painful since  2022 but note heavier no

## 2024-07-17 DIAGNOSIS — N93.9 ABNORMAL UTERINE AND VAGINAL BLEEDING, UNSPECIFIED: ICD-10-CM

## 2024-09-13 ENCOUNTER — TRANSCRIPTION ENCOUNTER (OUTPATIENT)
Age: 34
End: 2024-09-13

## 2024-09-13 DIAGNOSIS — N92.6 IRREGULAR MENSTRUATION, UNSPECIFIED: ICD-10-CM

## 2024-09-14 ENCOUNTER — TRANSCRIPTION ENCOUNTER (OUTPATIENT)
Age: 34
End: 2024-09-14

## 2024-09-14 LAB
HCG SERPL-MCNC: 63 MIU/ML
PROGEST SERPL-MCNC: 9.6 NG/ML
TSH SERPL-ACNC: 4.73 UIU/ML

## 2024-09-17 ENCOUNTER — TRANSCRIPTION ENCOUNTER (OUTPATIENT)
Age: 34
End: 2024-09-17

## 2024-09-19 ENCOUNTER — TRANSCRIPTION ENCOUNTER (OUTPATIENT)
Age: 34
End: 2024-09-19

## 2024-09-20 ENCOUNTER — TRANSCRIPTION ENCOUNTER (OUTPATIENT)
Age: 34
End: 2024-09-20

## 2024-09-20 LAB
HCG SERPL-MCNC: 983 MIU/ML
PROGEST SERPL-MCNC: 7.4 NG/ML

## 2024-09-23 ENCOUNTER — APPOINTMENT (OUTPATIENT)
Dept: OBGYN | Facility: CLINIC | Age: 34
End: 2024-09-23
Payer: COMMERCIAL

## 2024-09-23 VITALS
BODY MASS INDEX: 29.61 KG/M2 | DIASTOLIC BLOOD PRESSURE: 79 MMHG | WEIGHT: 184.25 LBS | HEIGHT: 66 IN | SYSTOLIC BLOOD PRESSURE: 116 MMHG

## 2024-09-23 DIAGNOSIS — O36.80X0 PREGNANCY WITH INCONCLUSIVE FETAL VIABILITY, NOT APPLICABLE OR UNSPECIFIED: ICD-10-CM

## 2024-09-23 DIAGNOSIS — N92.6 IRREGULAR MENSTRUATION, UNSPECIFIED: ICD-10-CM

## 2024-09-23 PROCEDURE — 76817 TRANSVAGINAL US OBSTETRIC: CPT

## 2024-09-23 PROCEDURE — 99214 OFFICE O/P EST MOD 30 MIN: CPT | Mod: 25

## 2024-09-23 RX ORDER — PROGESTERONE 200 MG/1
200 CAPSULE ORAL
Qty: 30 | Refills: 1 | Status: ACTIVE | COMMUNITY
Start: 2024-09-23 | End: 1900-01-01

## 2024-09-23 NOTE — PROCEDURE
[Transvaginal OB Sonogram] : Transvaginal OB Sonogram [FreeTextEntry1] : Small GS [id number], faint flicker of YS, no GS seen

## 2024-09-23 NOTE — PLAN
[FreeTextEntry1] : Small GS with ?YS seen, very early pregnancy. Discussed limited benefit of progesterone and supplements, however discussed likely little harm, Pt interested in trying supplementation. Check labs today, RTO 2 wk for repeat sono. Already on asa 81mg daily. TSH to be checked in 3wk by endo given recent uptitration of dosage. Pt reports sister with maple syrup urine dz carrier, will check horizon today.

## 2024-09-23 NOTE — HISTORY OF PRESENT ILLNESS
[FreeTextEntry1] : LMP  34yo  here for missed menses and +UPT would be 5+6. Concerned about hormone levels. HCG rising 63 () -> 983 () appropriately, pt concerned about low progesterone 9.6 () -> 7 (). Prev with hx miscarriage 3-4 weeks, chemical pregnancy. Also hx hypothyroid on synthroid, last TSH 4.7 on , since then increased to synthroid 112mcg last week. Interested in progesterone supplementation. Notes breast fulness and fatigue.   # Routine -  2022 after IOL for IUGR, vtop x1, chemical pregnancy x 1 # Hx anxiety # Hypothyroid on synthroid 112mcg # Hx benign pelvic masses s/p gyn onc consult, stable  for conservative mgmt

## 2024-09-24 ENCOUNTER — TRANSCRIPTION ENCOUNTER (OUTPATIENT)
Age: 34
End: 2024-09-24

## 2024-09-24 LAB
HCG SERPL-MCNC: 4919 MIU/ML
PROGEST SERPL-MCNC: 11.9 NG/ML

## 2024-10-11 ENCOUNTER — APPOINTMENT (OUTPATIENT)
Dept: OBGYN | Facility: CLINIC | Age: 34
End: 2024-10-11
Payer: COMMERCIAL

## 2024-10-11 DIAGNOSIS — R93.5 ABNORMAL FINDINGS ON DIAGNOSTIC IMAGING OF OTHER ABDOMINAL REGIONS, INCLUDING RETROPERITONEUM: ICD-10-CM

## 2024-10-11 DIAGNOSIS — Z87.42 PERSONAL HISTORY OF OTHER DISEASES OF THE FEMALE GENITAL TRACT: ICD-10-CM

## 2024-10-11 DIAGNOSIS — N92.6 IRREGULAR MENSTRUATION, UNSPECIFIED: ICD-10-CM

## 2024-10-11 DIAGNOSIS — Z01.419 ENCOUNTER FOR GYNECOLOGICAL EXAMINATION (GENERAL) (ROUTINE) W/OUT ABNORMAL FINDINGS: ICD-10-CM

## 2024-10-11 DIAGNOSIS — O36.80X0 PREGNANCY WITH INCONCLUSIVE FETAL VIABILITY, NOT APPLICABLE OR UNSPECIFIED: ICD-10-CM

## 2024-10-11 DIAGNOSIS — N93.9 ABNORMAL UTERINE AND VAGINAL BLEEDING, UNSPECIFIED: ICD-10-CM

## 2024-10-11 PROCEDURE — 99214 OFFICE O/P EST MOD 30 MIN: CPT | Mod: 25

## 2024-10-11 PROCEDURE — 76817 TRANSVAGINAL US OBSTETRIC: CPT

## 2024-10-11 PROCEDURE — 90656 IIV3 VACC NO PRSV 0.5 ML IM: CPT

## 2024-10-11 PROCEDURE — 90471 IMMUNIZATION ADMIN: CPT

## 2024-10-14 LAB
ABO + RH PNL BLD: NORMAL
ALBUMIN SERPL ELPH-MCNC: 3.9 G/DL
ALP BLD-CCNC: 99 U/L
ALT SERPL-CCNC: 14 U/L
ANION GAP SERPL CALC-SCNC: 13 MMOL/L
AST SERPL-CCNC: 8 U/L
B19V IGG SER QL IA: 0.41 INDEX
B19V IGG+IGM SER-IMP: NEGATIVE
B19V IGG+IGM SER-IMP: NORMAL
B19V IGM FLD-ACNC: 0.18 INDEX
B19V IGM SER-ACNC: NEGATIVE
BACTERIA UR CULT: NORMAL
BILIRUB SERPL-MCNC: 0.2 MG/DL
BLD GP AB SCN SERPL QL: NORMAL
BUN SERPL-MCNC: 12 MG/DL
C TRACH RRNA SPEC QL NAA+PROBE: NOT DETECTED
CALCIUM SERPL-MCNC: 10.5 MG/DL
CHLORIDE SERPL-SCNC: 103 MMOL/L
CO2 SERPL-SCNC: 20 MMOL/L
CREAT SERPL-MCNC: 0.6 MG/DL
EGFR: 122 ML/MIN/1.73M2
ESTIMATED AVERAGE GLUCOSE: 100 MG/DL
GLUCOSE SERPL-MCNC: 79 MG/DL
HBA1C MFR BLD HPLC: 5.1 %
HBV SURFACE AG SER QL: NONREACTIVE
HCT VFR BLD CALC: 37 %
HCV AB SER QL: NONREACTIVE
HCV S/CO RATIO: 0.11 S/CO
HGB A MFR BLD: 97.5 %
HGB A2 MFR BLD: 2.5 %
HGB BLD-MCNC: 11.8 G/DL
HGB FRACT BLD-IMP: NORMAL
HIV1+2 AB SPEC QL IA.RAPID: NONREACTIVE
LEAD BLD-MCNC: <1 UG/DL
MCHC RBC-ENTMCNC: 27.3 PG
MCHC RBC-ENTMCNC: 31.9 GM/DL
MCV RBC AUTO: 85.6 FL
MEV IGG FLD QL IA: 146 AU/ML
MEV IGG+IGM SER-IMP: POSITIVE
N GONORRHOEA RRNA SPEC QL NAA+PROBE: NOT DETECTED
PLATELET # BLD AUTO: 319 K/UL
POTASSIUM SERPL-SCNC: 4.3 MMOL/L
PROT SERPL-MCNC: 6.7 G/DL
RBC # BLD: 4.32 M/UL
RBC # FLD: 13.9 %
RUBV IGG FLD-ACNC: 1.2 INDEX
RUBV IGG SER-IMP: POSITIVE
SODIUM SERPL-SCNC: 136 MMOL/L
SOURCE AMPLIFICATION: NORMAL
T PALLIDUM AB SER QL IA: NEGATIVE
T3FREE SERPL-MCNC: 2.61 PG/ML
TSH SERPL-ACNC: 1.04 UIU/ML
VZV AB TITR SER: NEGATIVE
VZV IGG SER IF-ACNC: 0.03 S/CO
WBC # FLD AUTO: 15.89 K/UL

## 2024-11-05 ENCOUNTER — NON-APPOINTMENT (OUTPATIENT)
Age: 34
End: 2024-11-05

## 2024-11-05 DIAGNOSIS — H90.3 PIGMENTARY RETINAL DYSTROPHY: ICD-10-CM

## 2024-11-05 DIAGNOSIS — O36.80X0 PREGNANCY WITH INCONCLUSIVE FETAL VIABILITY, NOT APPLICABLE OR UNSPECIFIED: ICD-10-CM

## 2024-11-05 DIAGNOSIS — H35.52 PIGMENTARY RETINAL DYSTROPHY: ICD-10-CM

## 2024-11-07 ENCOUNTER — NON-APPOINTMENT (OUTPATIENT)
Age: 34
End: 2024-11-07

## 2024-11-07 ENCOUNTER — APPOINTMENT (OUTPATIENT)
Dept: OBGYN | Facility: CLINIC | Age: 34
End: 2024-11-07
Payer: COMMERCIAL

## 2024-11-07 ENCOUNTER — ASOB RESULT (OUTPATIENT)
Age: 34
End: 2024-11-07

## 2024-11-07 ENCOUNTER — APPOINTMENT (OUTPATIENT)
Dept: ANTEPARTUM | Facility: CLINIC | Age: 34
End: 2024-11-07

## 2024-11-07 VITALS — BODY MASS INDEX: 30.18 KG/M2 | DIASTOLIC BLOOD PRESSURE: 77 MMHG | WEIGHT: 187 LBS | SYSTOLIC BLOOD PRESSURE: 113 MMHG

## 2024-11-07 DIAGNOSIS — Z34.91 ENCOUNTER FOR SUPERVISION OF NORMAL PREGNANCY, UNSPECIFIED, FIRST TRIMESTER: ICD-10-CM

## 2024-11-07 PROCEDURE — 76801 OB US < 14 WKS SINGLE FETUS: CPT | Mod: 59

## 2024-11-07 PROCEDURE — 0501F PRENATAL FLOW SHEET: CPT

## 2024-11-07 PROCEDURE — 76813 OB US NUCHAL MEAS 1 GEST: CPT

## 2024-12-10 ENCOUNTER — OUTPATIENT (OUTPATIENT)
Dept: INPATIENT UNIT | Facility: HOSPITAL | Age: 34
LOS: 1 days | End: 2024-12-10
Payer: COMMERCIAL

## 2024-12-10 ENCOUNTER — EMERGENCY (EMERGENCY)
Facility: HOSPITAL | Age: 34
LOS: 1 days | Discharge: HOME | End: 2024-12-10
Attending: STUDENT IN AN ORGANIZED HEALTH CARE EDUCATION/TRAINING PROGRAM
Payer: COMMERCIAL

## 2024-12-10 ENCOUNTER — RESULT REVIEW (OUTPATIENT)
Age: 34
End: 2024-12-10

## 2024-12-10 VITALS
DIASTOLIC BLOOD PRESSURE: 84 MMHG | HEART RATE: 80 BPM | RESPIRATION RATE: 18 BRPM | SYSTOLIC BLOOD PRESSURE: 125 MMHG | OXYGEN SATURATION: 99 % | HEIGHT: 66 IN | WEIGHT: 184.97 LBS | TEMPERATURE: 97 F

## 2024-12-10 VITALS
HEART RATE: 89 BPM | SYSTOLIC BLOOD PRESSURE: 108 MMHG | TEMPERATURE: 98 F | RESPIRATION RATE: 17 BRPM | DIASTOLIC BLOOD PRESSURE: 71 MMHG

## 2024-12-10 VITALS
SYSTOLIC BLOOD PRESSURE: 108 MMHG | TEMPERATURE: 98 F | HEART RATE: 71 BPM | RESPIRATION RATE: 18 BRPM | DIASTOLIC BLOOD PRESSURE: 56 MMHG | OXYGEN SATURATION: 100 %

## 2024-12-10 DIAGNOSIS — O26.899 OTHER SPECIFIED PREGNANCY RELATED CONDITIONS, UNSPECIFIED TRIMESTER: ICD-10-CM

## 2024-12-10 LAB
ALBUMIN SERPL ELPH-MCNC: 3.4 G/DL — SIGNIFICANT CHANGE UP (ref 3.3–5)
ALP SERPL-CCNC: 74 U/L — SIGNIFICANT CHANGE UP (ref 40–120)
ALT FLD-CCNC: 6 U/L — LOW (ref 10–45)
ANION GAP SERPL CALC-SCNC: 13 MMOL/L — SIGNIFICANT CHANGE UP (ref 5–17)
APPEARANCE UR: ABNORMAL
AST SERPL-CCNC: 9 U/L — LOW (ref 10–40)
BACTERIA # UR AUTO: NEGATIVE /HPF — SIGNIFICANT CHANGE UP
BASOPHILS # BLD AUTO: 0.03 K/UL — SIGNIFICANT CHANGE UP (ref 0–0.2)
BASOPHILS NFR BLD AUTO: 0.3 % — SIGNIFICANT CHANGE UP (ref 0–2)
BILIRUB SERPL-MCNC: 0.2 MG/DL — SIGNIFICANT CHANGE UP (ref 0.2–1.2)
BILIRUB UR-MCNC: NEGATIVE — SIGNIFICANT CHANGE UP
BLD GP AB SCN SERPL QL: NEGATIVE — SIGNIFICANT CHANGE UP
BUN SERPL-MCNC: 10 MG/DL — SIGNIFICANT CHANGE UP (ref 7–23)
CALCIUM SERPL-MCNC: 10 MG/DL — SIGNIFICANT CHANGE UP (ref 8.4–10.5)
CAST: 0 /LPF — SIGNIFICANT CHANGE UP (ref 0–4)
CHLORIDE SERPL-SCNC: 105 MMOL/L — SIGNIFICANT CHANGE UP (ref 96–108)
CO2 SERPL-SCNC: 18 MMOL/L — LOW (ref 22–31)
COLOR SPEC: YELLOW — SIGNIFICANT CHANGE UP
CREAT SERPL-MCNC: 0.56 MG/DL — SIGNIFICANT CHANGE UP (ref 0.5–1.3)
DIFF PNL FLD: ABNORMAL
EGFR: 124 ML/MIN/1.73M2 — SIGNIFICANT CHANGE UP
EOSINOPHIL # BLD AUTO: 0.08 K/UL — SIGNIFICANT CHANGE UP (ref 0–0.5)
EOSINOPHIL NFR BLD AUTO: 0.7 % — SIGNIFICANT CHANGE UP (ref 0–6)
GLUCOSE SERPL-MCNC: 77 MG/DL — SIGNIFICANT CHANGE UP (ref 70–99)
GLUCOSE UR QL: NEGATIVE MG/DL — SIGNIFICANT CHANGE UP
HCT VFR BLD CALC: 34.6 % — SIGNIFICANT CHANGE UP (ref 34.5–45)
HGB BLD-MCNC: 11.6 G/DL — SIGNIFICANT CHANGE UP (ref 11.5–15.5)
IMM GRANULOCYTES NFR BLD AUTO: 0.3 % — SIGNIFICANT CHANGE UP (ref 0–0.9)
KETONES UR-MCNC: NEGATIVE MG/DL — SIGNIFICANT CHANGE UP
LEUKOCYTE ESTERASE UR-ACNC: ABNORMAL
LYMPHOCYTES # BLD AUTO: 17.4 % — SIGNIFICANT CHANGE UP (ref 13–44)
LYMPHOCYTES # BLD AUTO: 2.02 K/UL — SIGNIFICANT CHANGE UP (ref 1–3.3)
MCHC RBC-ENTMCNC: 28.4 PG — SIGNIFICANT CHANGE UP (ref 27–34)
MCHC RBC-ENTMCNC: 33.5 G/DL — SIGNIFICANT CHANGE UP (ref 32–36)
MCV RBC AUTO: 84.6 FL — SIGNIFICANT CHANGE UP (ref 80–100)
MONOCYTES # BLD AUTO: 0.76 K/UL — SIGNIFICANT CHANGE UP (ref 0–0.9)
MONOCYTES NFR BLD AUTO: 6.5 % — SIGNIFICANT CHANGE UP (ref 2–14)
NEUTROPHILS # BLD AUTO: 8.71 K/UL — HIGH (ref 1.8–7.4)
NEUTROPHILS NFR BLD AUTO: 74.8 % — SIGNIFICANT CHANGE UP (ref 43–77)
NITRITE UR-MCNC: NEGATIVE — SIGNIFICANT CHANGE UP
NRBC # BLD: 0 /100 WBCS — SIGNIFICANT CHANGE UP (ref 0–0)
PH UR: 8.5 (ref 5–8)
PLATELET # BLD AUTO: 252 K/UL — SIGNIFICANT CHANGE UP (ref 150–400)
POTASSIUM SERPL-MCNC: 3.9 MMOL/L — SIGNIFICANT CHANGE UP (ref 3.5–5.3)
POTASSIUM SERPL-SCNC: 3.9 MMOL/L — SIGNIFICANT CHANGE UP (ref 3.5–5.3)
PROT SERPL-MCNC: 6.4 G/DL — SIGNIFICANT CHANGE UP (ref 6–8.3)
PROT UR-MCNC: NEGATIVE MG/DL — SIGNIFICANT CHANGE UP
RBC # BLD: 4.09 M/UL — SIGNIFICANT CHANGE UP (ref 3.8–5.2)
RBC # FLD: 13.4 % — SIGNIFICANT CHANGE UP (ref 10.3–14.5)
RBC CASTS # UR COMP ASSIST: 68 /HPF — HIGH (ref 0–4)
RH IG SCN BLD-IMP: POSITIVE — SIGNIFICANT CHANGE UP
SODIUM SERPL-SCNC: 136 MMOL/L — SIGNIFICANT CHANGE UP (ref 135–145)
SP GR SPEC: 1.01 — SIGNIFICANT CHANGE UP (ref 1–1.03)
SQUAMOUS # UR AUTO: 0 /HPF — SIGNIFICANT CHANGE UP (ref 0–5)
UROBILINOGEN FLD QL: 0.2 MG/DL — SIGNIFICANT CHANGE UP (ref 0.2–1)
WBC # BLD: 11.64 K/UL — HIGH (ref 3.8–10.5)
WBC # FLD AUTO: 11.64 K/UL — HIGH (ref 3.8–10.5)
WBC UR QL: 2 /HPF — SIGNIFICANT CHANGE UP (ref 0–5)

## 2024-12-10 PROCEDURE — 81001 URINALYSIS AUTO W/SCOPE: CPT

## 2024-12-10 PROCEDURE — 86901 BLOOD TYPING SEROLOGIC RH(D): CPT

## 2024-12-10 PROCEDURE — 99282 EMERGENCY DEPT VISIT SF MDM: CPT

## 2024-12-10 PROCEDURE — 99212 OFFICE O/P EST SF 10 MIN: CPT

## 2024-12-10 PROCEDURE — 86850 RBC ANTIBODY SCREEN: CPT

## 2024-12-10 PROCEDURE — 96361 HYDRATE IV INFUSION ADD-ON: CPT

## 2024-12-10 PROCEDURE — 85025 COMPLETE CBC W/AUTO DIFF WBC: CPT

## 2024-12-10 PROCEDURE — 99222 1ST HOSP IP/OBS MODERATE 55: CPT

## 2024-12-10 PROCEDURE — 80053 COMPREHEN METABOLIC PANEL: CPT

## 2024-12-10 PROCEDURE — 76775 US EXAM ABDO BACK WALL LIM: CPT | Mod: 26

## 2024-12-10 PROCEDURE — 99221 1ST HOSP IP/OBS SF/LOW 40: CPT

## 2024-12-10 PROCEDURE — 76775 US EXAM ABDO BACK WALL LIM: CPT

## 2024-12-10 PROCEDURE — 86900 BLOOD TYPING SEROLOGIC ABO: CPT

## 2024-12-10 PROCEDURE — G0463: CPT

## 2024-12-10 PROCEDURE — 96375 TX/PRO/DX INJ NEW DRUG ADDON: CPT

## 2024-12-10 PROCEDURE — 96365 THER/PROPH/DIAG IV INF INIT: CPT

## 2024-12-10 PROCEDURE — G0378: CPT

## 2024-12-10 PROCEDURE — 99283 EMERGENCY DEPT VISIT LOW MDM: CPT

## 2024-12-10 PROCEDURE — 87086 URINE CULTURE/COLONY COUNT: CPT

## 2024-12-10 RX ORDER — ACETAMINOPHEN 500MG 500 MG/1
1000 TABLET, COATED ORAL ONCE
Refills: 0 | Status: COMPLETED | OUTPATIENT
Start: 2024-12-10 | End: 2024-12-10

## 2024-12-10 RX ORDER — ACETAMINOPHEN 500MG 500 MG/1
975 TABLET, COATED ORAL EVERY 6 HOURS
Refills: 0 | Status: DISCONTINUED | OUTPATIENT
Start: 2024-12-10 | End: 2024-12-24

## 2024-12-10 RX ORDER — 0.9 % SODIUM CHLORIDE 0.9 %
1000 INTRAVENOUS SOLUTION INTRAVENOUS ONCE
Refills: 0 | Status: COMPLETED | OUTPATIENT
Start: 2024-12-10 | End: 2024-12-10

## 2024-12-10 RX ORDER — TAMSULOSIN HYDROCHLORIDE 0.4 MG/1
0.4 CAPSULE ORAL ONCE
Refills: 0 | Status: DISCONTINUED | OUTPATIENT
Start: 2024-12-10 | End: 2024-12-24

## 2024-12-10 RX ORDER — 0.9 % SODIUM CHLORIDE 0.9 %
1000 INTRAVENOUS SOLUTION INTRAVENOUS
Refills: 0 | Status: DISCONTINUED | OUTPATIENT
Start: 2024-12-10 | End: 2024-12-10

## 2024-12-10 RX ORDER — CEFTRIAXONE SODIUM 1 G
1000 VIAL (EA) INJECTION EVERY 24 HOURS
Refills: 0 | Status: DISCONTINUED | OUTPATIENT
Start: 2024-12-10 | End: 2024-12-24

## 2024-12-10 RX ORDER — 0.9 % SODIUM CHLORIDE 0.9 %
1000 INTRAVENOUS SOLUTION INTRAVENOUS
Refills: 0 | Status: DISCONTINUED | OUTPATIENT
Start: 2024-12-10 | End: 2024-12-24

## 2024-12-10 RX ADMIN — Medication 100 MILLIGRAM(S): at 11:54

## 2024-12-10 RX ADMIN — ACETAMINOPHEN 500MG 400 MILLIGRAM(S): 500 TABLET, COATED ORAL at 07:25

## 2024-12-10 RX ADMIN — ACETAMINOPHEN 500MG 975 MILLIGRAM(S): 500 TABLET, COATED ORAL at 14:41

## 2024-12-10 RX ADMIN — Medication 250 MILLILITER(S): at 14:32

## 2024-12-10 RX ADMIN — Medication 2000 MILLILITER(S): at 07:25

## 2024-12-10 NOTE — OB PROVIDER TRIAGE NOTE - NSHPPHYSICALEXAM_GEN_ALL_CORE
Objective  – VS  T(C): 36.7 (12-10-24 @ 06:32)  HR: 63 (12-10-24 @ 07:18)  BP: 114/71 (12-10-24 @ 07:18)  RR: 18 (12-10-24 @ 06:32)  SpO2: 98% (12-10-24 @ 07:16)  – PE:   General: Appears in acute distress  CV: RRR  Pulm: breathing comfortably on RA  Abd: gravid, tender to palpation of LUQ. +CVA tenderness on left side.   Extr: moving all extremities with ease    – [place]: none    – Sono: vertex Objective  – VS  T(C): 36.7 (12-10-24 @ 06:32)  HR: 63 (12-10-24 @ 07:18)  BP: 114/71 (12-10-24 @ 07:18)  RR: 18 (12-10-24 @ 06:32)  SpO2: 98% (12-10-24 @ 07:16)    – PE:   General: Appears in acute distress  CV: RRR  Pulm: breathing comfortably on RA  Abd: gravid, soft, mild tenderness to deep palpation of LUQ.   +CVA tenderness on left side.   Extr: moving all extremities with ease    – [place]: no contractions     – Transabdominal US: +, cervix appears long, bladder full

## 2024-12-10 NOTE — ED PROVIDER NOTE - PATIENT PORTAL LINK FT
You can access the FollowMyHealth Patient Portal offered by [facility] by registering at the following website: [email or web address]. By joining [facility]’s FollowMyHealth portal, you will also be able to view your health information using other applications (apps) compatible with our system.

## 2024-12-10 NOTE — CONSULT NOTE ADULT - PROVIDER SPECIALTY LIST ADULT
[de-identified] : right hip pain pain getting into and out of a car  pain with ambulation  NAD Right hip:  no skin breakdown FF 0-110 ER 40 IR 20 positive impingement ttp hip ttp greater troch NVI comp soft and nt  Xray right hip: adv right hip arthritis, from regional  Plan: went over findings explained the imaging will start pt will send to PM for possible injections Urology

## 2024-12-10 NOTE — CONSULT NOTE ADULT - ASSESSMENT
33y/o F 17weeks pregnant presenting to L&D ED with severe left sided flank pain.       -f/u final read on sonogram  -f/u UCx  -increase hydration  -analgesics prn  -can not rule out ureteral stone on the left, but no acute  intervention is necessary at this time, as patient is afebrile, stable and pain is well controlled  -in the future if patient becomes febrile/unstable secondary to obstructive uropathy on the left, then may need urgent nephrostomy tube or ureteral stent placement  seen and examined with urology attending, RAMIREZ Spence MD

## 2024-12-10 NOTE — ED PROVIDER NOTE - CLINICAL SUMMARY MEDICAL DECISION MAKING FREE TEXT BOX
Ashly Attending- Patient currently 17 weeks pregnant presenting with mild lower abdominal pain.  Medical Screening Exam (MSE) conducted to determine ability to go to L&D for care, otherwise, no concerns.    VSS.  No chest pain or dyspnea.     PE: NAD, normal breathing distress, no focal abdominal TTP, no gross neuro deficits.      L&D charge nurse made aware by ED triage and will discharge directly into their care for continued evaluation.     Patient brought by staff to L&D for definitive care.

## 2024-12-10 NOTE — ED ADULT TRIAGE NOTE - MODE OF ARRIVAL
[employer] Auto Regarding: M 62 y/o  leg infection  ----- Message from JaretFlores Kushal sent at 3/2/2024 10:23 AM CST -----  Patient Name: Chon Fermin    Specialist or PCP Name:Nick Hernandez    Symptoms: leg infection    Pregnant (females aged 13-60. If Yes, how long?) : n/a    Call Back # : [phone]    Which State are you currently located in?: [place]    Name of Clinic Site / Acct# : [facility] - [address], [place]. \Hospital Has a New Name and Outlook.\"" [address]       Use following scripting for patients waiting for a callback:   \"Nurse callback times vary based on call volumes; please be aware the return phone call may come from an unidentified or out of state phone number. If your symptoms worsen or become life threatening while waiting, you should seek immediate assistance by calling 911 or going to the ER for evaluation.\"     Walk in

## 2024-12-10 NOTE — OB PROVIDER TRIAGE NOTE - ATTENDING COMMENTS
P0 @ 17 wks w presumed renal stone after recent travel  (clinically pain, blood in urine and renal sono showing obstruction)    currently pain controlled w IV tylenol and IVH hydration    urology  input appreciated    + FM, -LOF, -VB or ctx    s/p nml MFM sono    plan observation, pain cotrol and discharge in afternoon on tylenol and PO hydration    PIERRE Buckner MD  attending

## 2024-12-10 NOTE — ED ADULT TRIAGE NOTE - ADDITIONAL SAFETY/BANDS...
Nursing Progress Note: 



Problem: 58 y/o male, (prefers to be called Juventino) admitted to [facility] 7/25/2022 on 5150 for GD. 
Patient used to abuse alcohol and drugs but has been sober awhile per family report. Pt 
reportedly has Hx of schizophrenia and supposedly had psychotic break 4 days ago per 
brother. Patient originally found by brother in [facility]. The family was 
trying to receive help for the patient when he suddenly took off.



Intervention:  Physical assessment and 1:1 interview done. Pt. provided with a safe and 
therapeutic environment, clear communication, active listening and positive encouragement. 
Medication administration given as ordered with no adverse effect. 



Response: Patient was up before start of shift and sitting in the hallway listening to 
music. Pt.s roommate complained to this RN stating, Hes up all night, in and out of the 
room. RN asked pt. about only getting 0.5hrs of sleep last night, pt. states, No, I slept 
great. Pt. appears more socially withdrawn today, listening to headphones and pacing the 
halls and looking out the window. 1:1 done at bedside, pt. denies all psych symptoms. Pt. 
states, Im hoping to get out of here soon and get on with my life. 



Plan:  Patient continues to require crisis interruption and stabilization with medication 
management and monitoring in a safe and therapeutic environment. Additional Safety/Bands:

## 2024-12-10 NOTE — CONSULT NOTE ADULT - SUBJECTIVE AND OBJECTIVE BOX
HPI:  Patient is a 34y Female who presented with left flank pain x earlier today. +n/v, no fever, no chills, no dysuria  ?hx of nephrolithiasis at 15 years of age    PAST MEDICAL & SURGICAL HISTORY:  Hypothyroid      No significant past surgical history      FAMILY HISTORY:   No known  malignancy   SOCIAL HISTORY: Retired   Tobacco hx: smoker/nonsmoker   MEDICATIONS  (STANDING):  lactated ringers. 1000 milliLiter(s) (125 mL/Hr) IV Continuous <Continuous>    MEDICATIONS  (PRN):    Allergies    spironolactone (Hives)    Intolerances        REVIEW OF SYSTEMS: Pertinent positives and negatives as stated in HPI, otherwise negative    Vital signs  T(C): 36.8 (12-10-24 @ 08:33), Max: 36.8 (12-10-24 @ 08:33)  HR: 79 (12-10-24 @ 11:11)  BP: 96/52 (12-10-24 @ 09:54)  SpO2: 100% (12-10-24 @ 11:11)  Wt(kg): --    Physical Exam  Gen: NAD  Back: no CVAT b/l  Abd: gravid    LABS:    12-10 @ 08:42    WBC 11.64 / Hct 34.6  / SCr 0.56     12-10    136  |  105  |  10  ----------------------------<  77  3.9   |  18[L]  |  0.56    Ca    10.0      10 Dec 2024 08:42    TPro  6.4  /  Alb  3.4  /  TBili  0.2  /  DBili  x   /  AST  9[L]  /  ALT  6[L]  /  AlkPhos  74  12-10      Urinalysis Basic - ( 10 Dec 2024 08:55 )    Color: Yellow / Appearance: Cloudy / S.010 / pH: x  Gluc: x / Ketone: Negative mg/dL  / Bili: Negative / Urobili: 0.2 mg/dL   Blood: x / Protein: Negative mg/dL / Nitrite: Negative   Leuk Esterase: Trace / RBC: 68 /HPF / WBC 2 /HPF   Sq Epi: x / Non Sq Epi: 0 /HPF / Bacteria: Negative /HPF

## 2024-12-10 NOTE — OB PROVIDER TRIAGE NOTE - HISTORY OF PRESENT ILLNESS
R4 Triage H&P    MYRANDA WALLACE  [id number]    Subjective  HPI: 34yyo F GP gestational age presents for   +FM   -LOF   -CTXs   -VB. Pt denies any other concerns.    GBS   EFW       g by sono    PNC: Denies prenatal issues.   ObHx: none  GynHx: Denies hx of fibroids, ovarian cysts, abnml PAP smears, STIs  MedHx: Denies hx of HTN, DM, asthma, thyroid problems, blood clots/bleeding problems, hx of blood transfusions  Meds: PNV  All: NKDA  PSHx: Denies  FHx: Denies hx of blood clots/bleeding problems  Social: Denies alcohol/tobacco/drug use in pregnancy  Psych: Denies hx of anxiety/depression     – Will accept blood transfusions? Yes      Objective  – VS  T(C): 36.7 (12-10-24 @ 06:32)  HR: 63 (12-10-24 @ 07:18)  BP: 114/71 (12-10-24 @ 07:18)  RR: 18 (12-10-24 @ 06:32)  SpO2: 98% (12-10-24 @ 07:16)  – PE:   CV: RRR  Pulm: breathing comfortably on RA  Abd: gravid, nontender  Extr: moving all extremities with ease    – FS:     – Spec: pooling, nitrazine, ferning, bleeding  (lesions if patient with HSV2 history)    – VE: //  – FHT: baseline 1, mod variability, +accels, -decels  – [place]: q   min    – Sono: vertex  BPP:   CE:     Assessment  34y  yoF G P  @  presents for    .     Plan        D/w   R4 Triage H&P    MYRANDA WALLACE  [id number]    Subjective  HPI: Patient is a 35yo F  at 17w gestational age with PMH hypothyroidism, PCOS, and benign mesothelioma who presents for acute, left-sided abdominal pain radiating to the back since this morning. Her pain began at 5:00am this morning. It is a constant, sharp/stabbing 6-8/10 pain in her left abdomen which she states radiates to the back. She had some back pain last night as well. She has never experienced this pain before. She has not taken any pain medications. She attempted to drink water afterwards and threw it up. She denies any other nausea/vomiting, or symptoms such as fever, cough, or chills. They are not aware of any sick contacts, but they did travel to [place] last week. She has had some increased urinary frequency since 10 weeks gestation, but she reports no burning, pain, or blood in the urine. She had a normal bowel movement this morning. Patient states she may have had a kidney stone when she was 15, but no recent history. -FM  -LOF   -CTXs.   -VB. Pt denies any other concerns.     GBS unknown  EFW       g by sono    PNC: Denies prenatal issues.   ObHx:  - 22  IOL for IUGR. 6lbs 7oz. No HTN, gDM.   GynHx: Has PCOS. Denies hx of fibroids, ovarian cysts, abnml PAP smears, STIs  MedHx: Hypothyroidism, benign mesothelioma, gluten sensitivity. Denies hx of HTN, DM, asthma, thyroid problems, blood clots/bleeding problems, hx of blood transfusions  Meds: PNV, levothyroxin  All: Spironolactone  PSHx: Denies  Social: Denies alcohol/tobacco/drug use in pregnancy  Psych: Denies hx of anxiety/depression     – Will accept blood transfusions? Yes               R4 Triage H&P    MYRANDA WALLACE  [id number]    Subjective  HPI: Patient is a 35yo F  at 17w gestational age with PMH hypothyroidism, PCOS, and benign mesothelioma who presents for acute, left-sided abdominal pain radiating to the back since this morning. Her pain began at 5:00am this morning. It is a constant, sharp/stabbing 6-8/10 pain in her left abdomen which she states radiates to the back. She had some back pain last night as well. She has never experienced this pain before. She has not taken any pain medications. She attempted to drink water afterwards and threw it up. She denies any other nausea/vomiting, or symptoms such as fever, cough, or chills. They are not aware of any sick contacts, but they did travel to [place] last week. She has had some increased urinary frequency since 10 weeks gestation, but she reports no burning, pain, or blood in the urine. She had a normal bowel movement this morning. Patient states she may have had a kidney stone when she was 15, but no recent history. -FM  -LOF   -CTXs.   -VB. Pt denies any other concerns.     PNC: Denies prenatal issues.   ObHx:  - 22  IOL for IUGR. 6lbs 7oz. No HTN, gDM.   GynHx: Has PCOS. Denies hx of fibroids, ovarian cysts, abnml PAP smears, STIs  MedHx: Hypothyroidism, benign mesothelioma, gluten sensitivity.   Denies hx of HTN, DM, asthma, thyroid problems, blood clots/bleeding problems, hx of blood transfusions  Meds: PNV, levothyroxin  All: Spironolactone  PSHx: Denies  Social: Denies alcohol/tobacco/drug use in pregnancy  Psych: Denies hx of anxiety/depression     – Will accept blood transfusions? Yes

## 2024-12-10 NOTE — OB PROVIDER TRIAGE NOTE - NSOBPROVIDERNOTE_OBGYN_ALL_OB_FT
Assessment  34yoF   @ 17w  presents for acute left-sided abdominal pain radiating to the back since this morning. She reports 6-8/10, sharp, constant pain that radiates to the back. She threw up water this morning which she took to try to help the pain, but otherwise reports no nausea/vomiting, fever, chills, cough. She has no dysuria/hematuria, changes in bowel movements. On physical exam, patient appears in severe pain. She endorses left CVA tenderness as well as pain to palpation of left abdomen. Presentation concerning for nephrolithiasis vs pyelonephritis.    Plan  Abdominal pain:  - Urine culture  - Urinalysis  - Renal ultrasound  - IV hydration  - IV tylenol  Pregnancy:  - CBC  - Blood type    Melecio Ardon, MS3  Kate Ma, PGY-4  D/w  Assessment  34yoF  @ 17w  presents for acute 6-8/10 left flank pain. Associated with nausea, no other systemic symptoms.  Vital signs normal except severe range BP x1 on initial admission but immediate repeat BP WNL. No tachycardia or fever. abdomen benign, mildly tender on LLQ palpation and + left CVA tenderness. Pt appears very uncomfortable. Fetal status reassuring. [place] quiet, low concern for  labor. Differential includes pyelonephritis vs renal stone. Less likely ovarian torsion, degenerating fibroid.     Plan  -CBC/CMP/T&S  - UA/Urine culture  - Renal ultrasound  - IV hydration  - IV tylenol for pain, offered morphine, pt currently declining  - continue to monitor  - will check cervical length with TVUS when pt more comfortable     Melecio Ardon, MS3  Kate Ma, PGY-4  D/w Dr. Jett Assessment  34yoF  @ 17w  presents for acute 6-8/10 left flank pain. Associated with nausea, no other systemic symptoms.  Vital signs normal except severe range BP x1 on initial admission but immediate repeat BP WNL. No tachycardia or fever. abdomen benign, mildly tender on LLQ palpation and + left CVA tenderness. Pt appears very uncomfortable. Fetal status reassuring. [place] quiet, low concern for  labor. Differential includes pyelonephritis vs renal stone. Less likely ovarian torsion, degenerating fibroid.     Plan  -CBC/CMP/T&S  - UA/Urine culture  - Renal ultrasound  - IV hydration  - IV tylenol for pain, offered morphine, pt currently declining  - continue to monitor  - will check cervical length with TVUS when pt more comfortable     Melecio Ardon, MS3  Kate Ma, PGY-4  D/w Dr. Johnie NOWAK Addendum  Called by Dr Galvan (radiology). Renal u/s showed left sided fullness, no ureteral jet, no stones seen. Urology consulted. Appreciate recommendations.   Pain controlled with acetaminophen.  -increase IVF to 250ml/h  -ceftriaxone x 1 dose  -strict i's and o's  -strain urine  DW Dr Dudley Campoverde PA-C

## 2024-12-11 ENCOUNTER — NON-APPOINTMENT (OUTPATIENT)
Age: 34
End: 2024-12-11

## 2024-12-11 LAB
CULTURE RESULTS: SIGNIFICANT CHANGE UP
SPECIMEN SOURCE: SIGNIFICANT CHANGE UP

## 2024-12-12 ENCOUNTER — APPOINTMENT (OUTPATIENT)
Dept: OBGYN | Facility: CLINIC | Age: 34
End: 2024-12-12
Payer: COMMERCIAL

## 2024-12-12 VITALS
WEIGHT: 196.13 LBS | SYSTOLIC BLOOD PRESSURE: 124 MMHG | DIASTOLIC BLOOD PRESSURE: 84 MMHG | BODY MASS INDEX: 31.66 KG/M2

## 2024-12-12 DIAGNOSIS — M54.9 DORSALGIA, UNSPECIFIED: ICD-10-CM

## 2024-12-12 DIAGNOSIS — Z86.018 PERSONAL HISTORY OF OTHER BENIGN NEOPLASM: ICD-10-CM

## 2024-12-12 DIAGNOSIS — Z3A.17 17 WEEKS GESTATION OF PREGNANCY: ICD-10-CM

## 2024-12-12 DIAGNOSIS — E28.2 POLYCYSTIC OVARIAN SYNDROME: ICD-10-CM

## 2024-12-12 DIAGNOSIS — O99.891 OTHER SPECIFIED DISEASES AND CONDITIONS COMPLICATING PREGNANCY: ICD-10-CM

## 2024-12-12 DIAGNOSIS — R35.0 FREQUENCY OF MICTURITION: ICD-10-CM

## 2024-12-12 DIAGNOSIS — O99.282 ENDOCRINE, NUTRITIONAL AND METABOLIC DISEASES COMPLICATING PREGNANCY, SECOND TRIMESTER: ICD-10-CM

## 2024-12-12 DIAGNOSIS — Z34.92 ENCOUNTER FOR SUPERVISION OF NORMAL PREGNANCY, UNSPECIFIED, SECOND TRIMESTER: ICD-10-CM

## 2024-12-12 DIAGNOSIS — E03.9 HYPOTHYROIDISM, UNSPECIFIED: ICD-10-CM

## 2024-12-12 DIAGNOSIS — O09.292 SUPERVISION OF PREGNANCY WITH OTHER POOR REPRODUCTIVE OR OBSTETRIC HISTORY, SECOND TRIMESTER: ICD-10-CM

## 2024-12-12 DIAGNOSIS — R11.0 NAUSEA: ICD-10-CM

## 2024-12-12 DIAGNOSIS — N20.0 OTHER SPECIFIED DISEASES AND CONDITIONS COMPLICATING PREGNANCY: ICD-10-CM

## 2024-12-12 DIAGNOSIS — R03.0 ELEVATED BLOOD-PRESSURE READING, WITHOUT DIAGNOSIS OF HYPERTENSION: ICD-10-CM

## 2024-12-12 DIAGNOSIS — O26.892 OTHER SPECIFIED PREGNANCY RELATED CONDITIONS, SECOND TRIMESTER: ICD-10-CM

## 2024-12-12 DIAGNOSIS — R10.9 UNSPECIFIED ABDOMINAL PAIN: ICD-10-CM

## 2024-12-12 PROCEDURE — 0502F SUBSEQUENT PRENATAL CARE: CPT

## 2024-12-13 ENCOUNTER — TRANSCRIPTION ENCOUNTER (OUTPATIENT)
Age: 34
End: 2024-12-13

## 2024-12-13 LAB
AFP INTERP SERPL-IMP: NORMAL
AFP INTERP SERPL-IMP: NORMAL
AFP MOM CUT-OFF: 2.5
AFP MOM SERPL: 0.98
AFP PERCENTILE: 47.9
AFP SERPL-ACNC: 33.25 NG/ML
CARBAMAZEPINE?: NO
CURRENT SMOKER: NORMAL
DIABETES STATUS PATIENT: NORMAL
GA: NORMAL
GESTATIONAL AGE METHOD: NORMAL
GLUCOSE 1H P 50 G GLC PO SERPL-MCNC: 86 MG/DL
HX OF NTD NARR: NORMAL
MULTIPLE PREGNANCY: NORMAL
NEURAL TUBE DEFECT RISK FETUS: NORMAL
NEURAL TUBE DEFECT RISK POP: NORMAL
RECOM F/U: NO
T4 FREE SERPL-MCNC: 1 NG/DL
TEST PERFORMANCE INFO SPEC: NORMAL
TSH SERPL-ACNC: 1.03 UIU/ML
VALPROIC ACID?: NORMAL

## 2024-12-16 ENCOUNTER — TRANSCRIPTION ENCOUNTER (OUTPATIENT)
Age: 34
End: 2024-12-16

## 2024-12-19 ENCOUNTER — NON-APPOINTMENT (OUTPATIENT)
Age: 34
End: 2024-12-19

## 2024-12-20 ENCOUNTER — TRANSCRIPTION ENCOUNTER (OUTPATIENT)
Age: 34
End: 2024-12-20

## 2024-12-30 ENCOUNTER — EMERGENCY (EMERGENCY)
Facility: HOSPITAL | Age: 34
LOS: 1 days | Discharge: HOME | End: 2024-12-30
Attending: STUDENT IN AN ORGANIZED HEALTH CARE EDUCATION/TRAINING PROGRAM
Payer: COMMERCIAL

## 2024-12-30 ENCOUNTER — NON-APPOINTMENT (OUTPATIENT)
Age: 34
End: 2024-12-30

## 2024-12-30 VITALS
RESPIRATION RATE: 18 BRPM | OXYGEN SATURATION: 95 % | TEMPERATURE: 98 F | HEIGHT: 66 IN | WEIGHT: 190.04 LBS | HEART RATE: 109 BPM | DIASTOLIC BLOOD PRESSURE: 82 MMHG | SYSTOLIC BLOOD PRESSURE: 120 MMHG

## 2024-12-30 VITALS
OXYGEN SATURATION: 96 % | TEMPERATURE: 98 F | DIASTOLIC BLOOD PRESSURE: 80 MMHG | HEART RATE: 90 BPM | RESPIRATION RATE: 18 BRPM | SYSTOLIC BLOOD PRESSURE: 127 MMHG

## 2024-12-30 LAB
ALBUMIN SERPL ELPH-MCNC: 3.2 G/DL — LOW (ref 3.3–5)
ALP SERPL-CCNC: 81 U/L — SIGNIFICANT CHANGE UP (ref 40–120)
ALT FLD-CCNC: 9 U/L — LOW (ref 10–45)
ANION GAP SERPL CALC-SCNC: 11 MMOL/L — SIGNIFICANT CHANGE UP (ref 5–17)
AST SERPL-CCNC: 10 U/L — SIGNIFICANT CHANGE UP (ref 10–40)
BASOPHILS # BLD AUTO: 0.03 K/UL — SIGNIFICANT CHANGE UP (ref 0–0.2)
BASOPHILS NFR BLD AUTO: 0.3 % — SIGNIFICANT CHANGE UP (ref 0–2)
BILIRUB SERPL-MCNC: 0.1 MG/DL — LOW (ref 0.2–1.2)
BUN SERPL-MCNC: 8 MG/DL — SIGNIFICANT CHANGE UP (ref 7–23)
CALCIUM SERPL-MCNC: 10.1 MG/DL — SIGNIFICANT CHANGE UP (ref 8.4–10.5)
CHLORIDE SERPL-SCNC: 107 MMOL/L — SIGNIFICANT CHANGE UP (ref 96–108)
CO2 SERPL-SCNC: 20 MMOL/L — LOW (ref 22–31)
CREAT SERPL-MCNC: 0.5 MG/DL — SIGNIFICANT CHANGE UP (ref 0.5–1.3)
EGFR: 127 ML/MIN/1.73M2 — SIGNIFICANT CHANGE UP
EOSINOPHIL # BLD AUTO: 0.5 K/UL — SIGNIFICANT CHANGE UP (ref 0–0.5)
EOSINOPHIL NFR BLD AUTO: 4.5 % — SIGNIFICANT CHANGE UP (ref 0–6)
FLUAV AG NPH QL: SIGNIFICANT CHANGE UP
FLUBV AG NPH QL: SIGNIFICANT CHANGE UP
GLUCOSE SERPL-MCNC: 91 MG/DL — SIGNIFICANT CHANGE UP (ref 70–99)
HCG SERPL-ACNC: HIGH MIU/ML
HCT VFR BLD CALC: 36 % — SIGNIFICANT CHANGE UP (ref 34.5–45)
HGB BLD-MCNC: 11.8 G/DL — SIGNIFICANT CHANGE UP (ref 11.5–15.5)
IMM GRANULOCYTES NFR BLD AUTO: 0.4 % — SIGNIFICANT CHANGE UP (ref 0–0.9)
LYMPHOCYTES # BLD AUTO: 1.73 K/UL — SIGNIFICANT CHANGE UP (ref 1–3.3)
LYMPHOCYTES # BLD AUTO: 15.5 % — SIGNIFICANT CHANGE UP (ref 13–44)
MCHC RBC-ENTMCNC: 28.1 PG — SIGNIFICANT CHANGE UP (ref 27–34)
MCHC RBC-ENTMCNC: 32.8 G/DL — SIGNIFICANT CHANGE UP (ref 32–36)
MCV RBC AUTO: 85.7 FL — SIGNIFICANT CHANGE UP (ref 80–100)
MONOCYTES # BLD AUTO: 0.95 K/UL — HIGH (ref 0–0.9)
MONOCYTES NFR BLD AUTO: 8.5 % — SIGNIFICANT CHANGE UP (ref 2–14)
NEUTROPHILS # BLD AUTO: 7.9 K/UL — HIGH (ref 1.8–7.4)
NEUTROPHILS NFR BLD AUTO: 70.8 % — SIGNIFICANT CHANGE UP (ref 43–77)
NRBC # BLD: 0 /100 WBCS — SIGNIFICANT CHANGE UP (ref 0–0)
PLATELET # BLD AUTO: 261 K/UL — SIGNIFICANT CHANGE UP (ref 150–400)
POTASSIUM SERPL-MCNC: 4.1 MMOL/L — SIGNIFICANT CHANGE UP (ref 3.5–5.3)
POTASSIUM SERPL-SCNC: 4.1 MMOL/L — SIGNIFICANT CHANGE UP (ref 3.5–5.3)
PROT SERPL-MCNC: 6.6 G/DL — SIGNIFICANT CHANGE UP (ref 6–8.3)
RBC # BLD: 4.2 M/UL — SIGNIFICANT CHANGE UP (ref 3.8–5.2)
RBC # FLD: 13 % — SIGNIFICANT CHANGE UP (ref 10.3–14.5)
RSV RNA NPH QL NAA+NON-PROBE: DETECTED
SARS-COV-2 RNA SPEC QL NAA+PROBE: SIGNIFICANT CHANGE UP
SODIUM SERPL-SCNC: 138 MMOL/L — SIGNIFICANT CHANGE UP (ref 135–145)
WBC # BLD: 11.16 K/UL — HIGH (ref 3.8–10.5)
WBC # FLD AUTO: 11.16 K/UL — HIGH (ref 3.8–10.5)

## 2024-12-30 PROCEDURE — 71045 X-RAY EXAM CHEST 1 VIEW: CPT | Mod: 26

## 2024-12-30 PROCEDURE — 80053 COMPREHEN METABOLIC PANEL: CPT

## 2024-12-30 PROCEDURE — 87637 SARSCOV2&INF A&B&RSV AMP PRB: CPT

## 2024-12-30 PROCEDURE — 99284 EMERGENCY DEPT VISIT MOD MDM: CPT | Mod: 25

## 2024-12-30 PROCEDURE — 85025 COMPLETE CBC W/AUTO DIFF WBC: CPT

## 2024-12-30 PROCEDURE — 86615 BORDETELLA ANTIBODY: CPT

## 2024-12-30 PROCEDURE — 84702 CHORIONIC GONADOTROPIN TEST: CPT

## 2024-12-30 PROCEDURE — 71045 X-RAY EXAM CHEST 1 VIEW: CPT

## 2024-12-30 PROCEDURE — 99284 EMERGENCY DEPT VISIT MOD MDM: CPT

## 2024-12-30 RX ORDER — ACETAMINOPHEN 500MG 500 MG/1
975 TABLET, COATED ORAL ONCE
Refills: 0 | Status: COMPLETED | OUTPATIENT
Start: 2024-12-30 | End: 2024-12-30

## 2024-12-30 RX ADMIN — ACETAMINOPHEN 500MG 975 MILLIGRAM(S): 500 TABLET, COATED ORAL at 13:31

## 2024-12-30 NOTE — ED PROVIDER NOTE - PATIENT PORTAL LINK FT
You can access the FollowMyHealth Patient Portal offered by [facility] by registering at the following website: [email or web address]. By joining [employer]’s FollowMyHealth portal, you will also be able to view your health information using other applications (apps) compatible with our system.

## 2024-12-30 NOTE — ED PROVIDER NOTE - PROGRESS NOTE DETAILS
Monica Antunez MD (PGY2) Pt reassessed at bedside and is feeling much better. Pt made aware of findings. Positive rsv expalins pts sx. Pt given strict return precautions. Natasha Joyner, Attending Physician: Patient signed out to me by Dr. Arevalo.  Patient's RSV is positive which most likely explains her symptoms.  No lower extremity swelling.  Patient is mildly tachycardic however this is explained by pregnancy.  No evidence of pneumonia on x-ray.  I went in the room to discuss the results and plan for discharge with patient.  Patient was tearful.  She explained that she is not suicidal but feels often overwhelmed by her situation, having to work, having to take care of her child and the demands that are placed on her.  Family at bedside during this conversation.  I offered peripartum psychiatry resources which she is amenable to.  I printed and include them on her discharge paperwork.  I also offered pulm follow-up given patient has had chronic cough however patient deferred at this time.

## 2024-12-30 NOTE — ED ADULT NURSE NOTE - OBJECTIVE STATEMENT
Pt presents to the ED A&ox4 co flu like symptoms, cough x 3 weeks. Pt is 20 weeks pregnant. Recent travel to [place], returned Dec 5th. Pt went to  for evaluation and came back negative for covid and flu. Pt found to have been tachycardic and was referred to ER for further evaluation. Denies fevers, chills, abdominal pain, vaginal bleeding. Pt also endorsing L ear pain- partial relief with tylenol.

## 2024-12-30 NOTE — ED PROVIDER NOTE - RAPID ASSESSMENT
34-year-old female  currently 20 weeks gestational age reportedly presents to the ED complaining of approximately 3 weeks of cough, occasionally reporting posttussive emesis.  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  Patient has been trying over-the-counter medications with minimal relief was told by her OB/GYN to be tested for select viruses at urgent care today, reports an urgent care tested negative for rapid strep, COVID, influenza but was unable to be tested for RSV was referred to the ED due to tachycardia.  Patient has not taken any antipyretics today, occasionally takes Tylenol.  Patient denies any abdominal pain, vaginal bleeding, loss of fluid vaginally.  Patient denies nausea at this time.  Patient reporting pain in left ear and mild headache.    Patient was seen as a rapid assessment (QPA) patient. This is an incomplete workup and it is intended that the patient will be seen in the main emergency department. The patient will be seen and further worked up in the main emergency department and their care will be completed by the main emergency department team along with a thorough physical exam. Receiving team will follow up on labs, analgesia, any clinical imaging, reassess and disposition as clinically indicated, all decisions regarding the progression of care will be made at their discretion. - Froilan Cano PA-C

## 2024-12-30 NOTE — ED PROVIDER NOTE - CHILD ABUSE FACILITY
1. Continue current diet order  2. Encourage and monitor PO intake, honor preferences as able   >> Consistently meet >75% of estimated needs during admission   >> Consider oral nutrition supplement or liberalizing diet should intake decline </= 50%   3. Monitor labs, wt trends, GI function, and skin integrity   4. Pain and bowel regimen per team   5. RD to remain available for additional nutrition interventions and diet edu as needed 
[facility]

## 2024-12-30 NOTE — ED ADULT TRIAGE NOTE - CHIEF COMPLAINT QUOTE
Monitor: The patient's cardiac condition is unchanged.  Evaluation: No diagnostic tests required today.  Assessment/Treatment:  Refer to specialist per encounter note.  Condition will be reassessed per specialist managing the condition   Patient c/o URI symptoms x 3 weeks. Neg covid, flu ,strep. 20 wks pregnant.

## 2024-12-30 NOTE — ED PROVIDER NOTE - NS_EDPROVIDERDISPOUSERTYPE_ED_A_ED
Addended by: ROZINA JENKINS on: 9/1/2021 01:59 PM     Modules accepted: Orders     Attending Attestation (For Attendings USE Only)...

## 2024-12-30 NOTE — ED PROVIDER NOTE - NSFOLLOWUPINSTRUCTIONS_ED_ALL_ED_FT
It was a pleasure caring for you today!    You were seen in the ER today for cough. Please follow up with your ob/gyn within the next 3-5 days.     Please follow up with your primary care doctor within 1 - 3 days. Call and let them know you were seen in the ER today.   Bring the results of your blood work and imaging with you to your appointment, if applicable.    For pain, please take acetaminophen 650 mg every 6 hours for pain. Additionally, you can also take ibuprofen 400 mg every 6-8 hours for pain.    Return to the ER for any worsening symptoms or concerns, including chest pain, shortness of breath, lightheadedness, weakness, or any other concerns.

## 2024-12-30 NOTE — ED PROCEDURE NOTE - FOLLOW-UP STUDY
Annual gyne exam     56 year old  female    HPI: LMP 5 yrs ago. Started Estrogen only HRT patch from [facility] 3-4 months ago due to pain with intercourse and low libido.  Reported vaginal spotting x 1 about  2 months ago on Estrogen only HRT patch. Pt stopped after Vaginal spotting occurred.  Hot flushes improved somewhat on HRT .    H/O genital HSV , occasional outbreaks       OB History    Para Term  AB Living   1 1 1 0 0 1   SAB IAB Ectopic Molar Multiple Live Births   0 0 0 0 0 1      # Outcome Date GA Lbr Miguel/2nd Weight Sex Delivery Anes PTL Lv   1 Term     F Vag-Spont   ARNOL       Gyn History  Postmenopausal since 50yo   Pap smears: nl   Mammo: nl mammo not dense  Colonoscopy:  nl colonocopy per pt () repeat 10 yr    Past Medical History:   Diagnosis Date   • Anxiety    • Diabetes mellitus (CMD)        Past Surgical History:   Procedure Laterality Date   • Removal gallbladder         Family History   Problem Relation Age of Onset   • Thyroid Mother    • Hypertension Father         diabetes   • Patient is unaware of any medical problems Sister    • Patient is unaware of any medical problems Brother    • Patient is unaware of any medical problems Daughter    • Patient is unaware of any medical problems Son    • Patient is unaware of any medical problems Maternal Aunt    • Patient is unaware of any medical problems Maternal Uncle    • Patient is unaware of any medical problems Paternal Aunt    • Patient is unaware of any medical problems Paternal Uncle    • Patient is unaware of any medical problems Maternal Grandmother    • Patient is unaware of any medical problems Paternal Grandmother    • Patient is unaware of any medical problems Maternal Grandfather    • Patient is unaware of any medical problems Paternal Grandfather    • Cancer, Breast Neg Hx    • Cancer, Colon Neg Hx    • Cancer, Ovarian Neg Hx    • Cancer, Endometrial Neg Hx    • Clotting Disorder Neg Hx        Social  History     Tobacco Use   • Smoking status: Never     Passive exposure: Never   • Smokeless tobacco: Never   Vaping Use   • Vaping Use: never used   Substance Use Topics   • Alcohol use: Never   • Drug use: Never       Current Outpatient Medications   Medication Sig Dispense Refill   • Multiple Vitamins-Minerals (MULTIVITAL PO)      • Calcium Citrate-Vitamin D 500-12.5 MG-MCG Pack Take 1 mg by mouth daily. 90 each 3   • Multiple Vitamins-Minerals (Multivitamin Women) Tab Take 1 capsule by mouth daily. 90 tablet 3   • valACYclovir (Valtrex) 500 MG tablet Take 1 tablet by mouth in the morning and 1 tablet in the evening. Take one pill twice a day for 3 days 6 tablet 3   • estradiol (ESTRACE) 0.1 MG/GM vaginal cream Apply half an applicator (0.5g) of cream intravaginally every night x 2 wks, then twice a week for 2 weeks, then once a week or once every other wk as needed. 42.5 g 3   • diphenhydrAMINE (Benadryl Allergy) 25 MG capsule Take 1 capsule by mouth nightly as needed for Itching. Take one pill at night to help with sleep. 30 capsule 0   • Black Cohosh-Soy-Ginkgo-Magnol (Estroven Mood & Memory) tablet Take 90 tablets by mouth daily. Take one pill daily for symptoms of hot flushes. Stop after 4-6wks if no improvement in hot flushes. 90 tablet 3   • vitamin E 100 units capsule Take 1 capsule by mouth daily. Take one pill daily for symptoms of hot flushes 90 capsule 3   • Vaginal Lubricant (Replens) Gel Place 1 applicator vaginally 4 times daily as needed (vaginal atrophy). 1 each 11   • EQL Evening Primrose Oil 500 MG Cap Take 500 mg by mouth daily. 30 capsule 11   • lisinopril (ZESTRIL) 10 MG tablet Take 1 tablet by mouth every 24 hours. 90 tablet 0   • metFORMIN (GLUCOPHAGE) 850 MG tablet Take 1 tablet by mouth in the morning and 1 tablet in the evening. Take with meals. 180 tablet 0   • simvastatin (ZOCOR) 20 MG tablet Take 1 tablet by mouth every 24 hours. 90 tablet 0   • venlafaxine (EFFEXOR) 37.5 MG tablet  Take 1 tablet by mouth every 24 hours. 30 tablet 2   • olopatadine (PATADAY) 0.2 % ophthalmic solution Place 1 drop into both eyes daily. 2.5 mL 1   • cetirizine (ZyrTEC) 10 MG tablet 1 tablet Orally once every night for 30 day(s)     • phenazopyridine (Pyridium) 200 MG tablet every 8 hours.     • zoster vaccine recomb adjuvanted (Shingrix) 50 MCG/0.5ML injection as directed Intramuscular as directed for 1 days     • pneumococcal 23-valent vaccine (Pneumovax 23) 25 MCG/0.5ML injection as directed Injection as directed for 1 days     • Dextromethorphan-guaiFENesin (Mucinex DM)  MG TABLET SR 12 HR Take 2 tablets by mouth in the morning and 2 tablets in the evening. 28 tablet 0     No current facility-administered medications for this visit.       ALLERGIES:  Patient has no known allergies.    All systems reviewed and negative except for those mentioned in the history of present illness    P/E:     Visit Vitals  /72 (BP Location: RUE - Right upper extremity, Patient Position: Sitting, Cuff Size: Regular)   Pulse 82   Temp 97.4 °F (36.3 °C) (Tympanic)   Resp 16   Ht 5' 2.52\" (1.588 m)   Wt 59.9 kg (132 lb)   SpO2 98%   BMI 23.74 kg/m²       Constitutional Exam: A&O x 3, NAD, well groomed.     Skin: No rashes/lesions/ulcers  Head and neck:  No thyromegaly/tenderness/lymphadenopathy. Symmetrical.   Lungs:      CTA B/L. No wheezes, rales  Heart :      RRR. No murmurs  Supraclavicular Lymph Nodes: No lymphadenopathy.   Axillary Lymph Nodes: No lymphadenopathy.   Breasts:     -Tenderness: No  -Mass: No  -Skin changes: No  -Nipples: No discharge    Abdomen: soft, NT, no masses    PELVIC EXAM:  Vulva: No lesions/ulcers/masses  Vagina: Normal atrophic caliper, no abnl d/c/ulcers/masses/lesions  Cervix: No lesions/masses/CMT  Uterus: small av, NT  Adnexa: No masses, NT  Perineum: No masses, scarring, lesions  Bladder: NT, no masses    Extremities: No edema/erythema    TSH (mIU/L)   Date Value   07/08/2023 0.60      8/23 Pelvic pain: rad US EMS 2mm, 4cm post fibroid, 1cm Lt simple ov cyst    ASSESSMENT/PLAN: Routine Gyne Exam     CC:   1. PM spotting x 1 about 2 months after starting Estrogen only HRT patch from [facility].   -Pt stopped HRT when spotting occurred  -US last month EMS 2mm, Reassured pt.  -Agree with discontinuing estrogen only HRT parch to reduce risk of EM Ca, CVD given underlying co-morbidities (CHTN, T22DM, hypercholesterolemia). Supportive tx with exercise, cooling blanket/pillows, OTC estroven, vit E, primrose, replens, vulvar hygeine precautions, delroy oil.   -Prescribed Estrace cream AS DIRECTED if supportive tx does not improve atrophic vaginitis. Aware not to take daily for prolonged time to decrease risk of EM CA,    - small asx 4cm fibroid. Aware fibroid will most likely decrease in size with menopause. . Recommend expectant management      A total of 34 minutes was spent reviewing the patient's records, performing history and exam. More than 50% of this time was spent with patient providing face to face counseling and coordination of care.     -Pap smear: no H/O abnl pap smears. Pap HRHPV reflex Done  per ASCCP recommendation.      -Breast: SBE demonstrated, perform monthly. RTC if breast abnormalities noted. 8/23 nl mammo not dense . TC 4% lifetime risk of breast ca, rec routine screening.     -Atrophic vaginitis: Lubricants. Vulvar hygiene care precautions given. OTC remedies recommended. Consider Premarin cream if no improvement    -Healthy lifestyle modifications: Healthy diet & daily exercise. Daily MV/Ca+/Vit D if inadequate daily dietary intake & weight bearing exercises to prevent osteoporosis. Fall precautions given.        -STI: Prevention precautions: declined testing.                 -RHM: f/u with primary MD.          -Colonoscopy 2020 nl colonocopy per pt () repeat 10 yr         -All questions answered, written information given    Return in about 1 year (around 9/25/2024) for annual  Doppler exam.    Sachi Campos MD

## 2024-12-30 NOTE — ED PROVIDER NOTE - OBJECTIVE STATEMENT
34-year-old female  currently 20 weeks gestational age reportedly presents to the ED complaining of approximately 3 weeks of cough, occasionally reporting posttussive emesis.  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  Patient has been trying over-the-counter medications with minimal relief was told by her OB/GYN to be tested for select viruses at urgent care today, reports an urgent care tested negative for rapid strep, COVID, influenza but was unable to be tested for RSV was referred to the ED due to tachycardia.  Patient has not taken any antipyretics today, occasionally takes Tylenol.  Patient denies any abdominal pain, vaginal bleeding, loss of fluid vaginally.  Patient denies nausea at this time.  Patient reporting pain in left ear and mild headache.

## 2024-12-30 NOTE — ED PROVIDER NOTE - CLINICAL SUMMARY MEDICAL DECISION MAKING FREE TEXT BOX
35yo F who is 20 weeks pregnant p/w cough and tachycardia. Cough has been ongoing for 3 weeks with episodes of post-tussive emesis. Denies any fevers, chills, nausea, diarrhea. Has sick contacts as her current child is constantly sick while they are in day care. Possible etiologies include viral illness (most likely) with post viral cough, pertussis (less likely as patient has been vaccinated in the past), DVT (fairly unlikely given no leg swelling and cough being primary symptom). Will f/u labwork and viral panel. If persistently tachycardic may send a d-dimer.

## 2024-12-30 NOTE — ED PROCEDURE NOTE - PROCEDURE ADDITIONAL DETAILS
FHR: 170 FHR: 170  Emergency Department Focused Ultrasound performed at patient's bedside for educational purposes. The study will have a follow up study performed or was performed in the direct supervision of an ultrasound trained attending.

## 2025-01-02 ENCOUNTER — ASOB RESULT (OUTPATIENT)
Age: 35
End: 2025-01-02

## 2025-01-02 ENCOUNTER — APPOINTMENT (OUTPATIENT)
Dept: ANTEPARTUM | Facility: CLINIC | Age: 35
End: 2025-01-02
Payer: COMMERCIAL

## 2025-01-02 LAB
B PERT IGG SER-ACNC: 2.88 INDEX — HIGH (ref 0–0.94)
B PERT IGM SER-ACNC: <1 INDEX — SIGNIFICANT CHANGE UP (ref 0–0.9)

## 2025-01-02 PROCEDURE — 76811 OB US DETAILED SNGL FETUS: CPT

## 2025-01-03 RX ORDER — AZITHROMYCIN 250 MG/1
1 TABLET, FILM COATED ORAL
Qty: 1 | Refills: 0
Start: 2025-01-03 | End: 2025-01-07

## 2025-01-03 NOTE — ED POST DISCHARGE NOTE - ADDITIONAL DOCUMENTATION
Patient will need to complete at least 5 days of antibiotic therapy prior to returning to work, I updated the return to work today in provider note so that I may provide patient a work excuse form that reflects this. -Froilan Cano PA-C

## 2025-01-13 ENCOUNTER — NON-APPOINTMENT (OUTPATIENT)
Age: 35
End: 2025-01-13

## 2025-01-14 ENCOUNTER — NON-APPOINTMENT (OUTPATIENT)
Age: 35
End: 2025-01-14

## 2025-01-14 ENCOUNTER — APPOINTMENT (OUTPATIENT)
Dept: OBGYN | Facility: CLINIC | Age: 35
End: 2025-01-14
Payer: COMMERCIAL

## 2025-01-14 VITALS
DIASTOLIC BLOOD PRESSURE: 80 MMHG | WEIGHT: 199.38 LBS | BODY MASS INDEX: 32.18 KG/M2 | SYSTOLIC BLOOD PRESSURE: 122 MMHG

## 2025-01-14 DIAGNOSIS — Z34.92 ENCOUNTER FOR SUPERVISION OF NORMAL PREGNANCY, UNSPECIFIED, SECOND TRIMESTER: ICD-10-CM

## 2025-01-14 PROCEDURE — 0502F SUBSEQUENT PRENATAL CARE: CPT

## 2025-02-14 ENCOUNTER — APPOINTMENT (OUTPATIENT)
Dept: OBGYN | Facility: CLINIC | Age: 35
End: 2025-02-14
Payer: COMMERCIAL

## 2025-02-14 VITALS
SYSTOLIC BLOOD PRESSURE: 116 MMHG | BODY MASS INDEX: 33.61 KG/M2 | DIASTOLIC BLOOD PRESSURE: 77 MMHG | WEIGHT: 208.25 LBS

## 2025-02-14 DIAGNOSIS — Z34.93 ENCOUNTER FOR SUPERVISION OF NORMAL PREGNANCY, UNSPECIFIED, THIRD TRIMESTER: ICD-10-CM

## 2025-02-14 DIAGNOSIS — Z34.92 ENCOUNTER FOR SUPERVISION OF NORMAL PREGNANCY, UNSPECIFIED, SECOND TRIMESTER: ICD-10-CM

## 2025-02-14 PROCEDURE — 0502F SUBSEQUENT PRENATAL CARE: CPT

## 2025-02-17 ENCOUNTER — TRANSCRIPTION ENCOUNTER (OUTPATIENT)
Age: 35
End: 2025-02-17

## 2025-02-17 LAB
BASOPHILS # BLD AUTO: 0.05 K/UL
BASOPHILS NFR BLD AUTO: 0.3 %
EOSINOPHIL # BLD AUTO: 0.16 K/UL
EOSINOPHIL NFR BLD AUTO: 1.1 %
GLUCOSE 1H P 50 G GLC PO SERPL-MCNC: 88 MG/DL
HCT VFR BLD CALC: 33.1 %
HGB BLD-MCNC: 10.7 G/DL
HIV1+2 AB SPEC QL IA.RAPID: NONREACTIVE
IMM GRANULOCYTES NFR BLD AUTO: 0.7 %
LYMPHOCYTES # BLD AUTO: 2.71 K/UL
LYMPHOCYTES NFR BLD AUTO: 18.1 %
MAN DIFF?: NORMAL
MCHC RBC-ENTMCNC: 28.4 PG
MCHC RBC-ENTMCNC: 32.3 G/DL
MCV RBC AUTO: 87.8 FL
MONOCYTES # BLD AUTO: 1.08 K/UL
MONOCYTES NFR BLD AUTO: 7.2 %
NEUTROPHILS # BLD AUTO: 10.9 K/UL
NEUTROPHILS NFR BLD AUTO: 72.6 %
PLATELET # BLD AUTO: 276 K/UL
RBC # BLD: 3.77 M/UL
RBC # FLD: 13.5 %
T PALLIDUM AB SER QL IA: NEGATIVE
WBC # FLD AUTO: 15 K/UL

## 2025-02-27 ENCOUNTER — APPOINTMENT (OUTPATIENT)
Dept: ANTEPARTUM | Facility: CLINIC | Age: 35
End: 2025-02-27
Payer: COMMERCIAL

## 2025-02-27 ENCOUNTER — ASOB RESULT (OUTPATIENT)
Age: 35
End: 2025-02-27

## 2025-02-27 PROCEDURE — 76816 OB US FOLLOW-UP PER FETUS: CPT

## 2025-03-04 ENCOUNTER — NON-APPOINTMENT (OUTPATIENT)
Age: 35
End: 2025-03-04

## 2025-03-04 ENCOUNTER — APPOINTMENT (OUTPATIENT)
Dept: OBGYN | Facility: CLINIC | Age: 35
End: 2025-03-04
Payer: COMMERCIAL

## 2025-03-04 VITALS — WEIGHT: 211 LBS | SYSTOLIC BLOOD PRESSURE: 120 MMHG | BODY MASS INDEX: 34.06 KG/M2 | DIASTOLIC BLOOD PRESSURE: 70 MMHG

## 2025-03-04 DIAGNOSIS — Z23 ENCOUNTER FOR IMMUNIZATION: ICD-10-CM

## 2025-03-04 PROCEDURE — 90715 TDAP VACCINE 7 YRS/> IM: CPT

## 2025-03-04 PROCEDURE — 90471 IMMUNIZATION ADMIN: CPT

## 2025-03-04 PROCEDURE — 0502F SUBSEQUENT PRENATAL CARE: CPT

## 2025-03-19 NOTE — OB RN PATIENT PROFILE - CAREGIVER NAME
----- Message from KHLOE Deleon sent at 3/19/2025  1:43 PM EDT -----  Please call patient  Per Dr Guerra, no further testing or medication changes to manage his BP.   Recommend to keep a daily blood pressure log for about a month and send to him before his next appointment   Timothy Yip

## 2025-03-20 ENCOUNTER — NON-APPOINTMENT (OUTPATIENT)
Age: 35
End: 2025-03-20

## 2025-03-21 ENCOUNTER — NON-APPOINTMENT (OUTPATIENT)
Age: 35
End: 2025-03-21

## 2025-03-21 ENCOUNTER — APPOINTMENT (OUTPATIENT)
Dept: OBGYN | Facility: CLINIC | Age: 35
End: 2025-03-21
Payer: COMMERCIAL

## 2025-03-21 VITALS
DIASTOLIC BLOOD PRESSURE: 75 MMHG | BODY MASS INDEX: 34.06 KG/M2 | WEIGHT: 211.03 LBS | SYSTOLIC BLOOD PRESSURE: 115 MMHG

## 2025-03-21 PROCEDURE — 0502F SUBSEQUENT PRENATAL CARE: CPT

## 2025-03-27 ENCOUNTER — ASOB RESULT (OUTPATIENT)
Age: 35
End: 2025-03-27

## 2025-03-27 ENCOUNTER — APPOINTMENT (OUTPATIENT)
Dept: ANTEPARTUM | Facility: CLINIC | Age: 35
End: 2025-03-27
Payer: COMMERCIAL

## 2025-03-27 PROCEDURE — 76816 OB US FOLLOW-UP PER FETUS: CPT

## 2025-04-08 ENCOUNTER — NON-APPOINTMENT (OUTPATIENT)
Age: 35
End: 2025-04-08

## 2025-04-08 ENCOUNTER — APPOINTMENT (OUTPATIENT)
Dept: OBGYN | Facility: CLINIC | Age: 35
End: 2025-04-08
Payer: COMMERCIAL

## 2025-04-08 VITALS
HEIGHT: 66 IN | WEIGHT: 215 LBS | BODY MASS INDEX: 34.55 KG/M2 | DIASTOLIC BLOOD PRESSURE: 82 MMHG | SYSTOLIC BLOOD PRESSURE: 131 MMHG

## 2025-04-08 PROCEDURE — 0502F SUBSEQUENT PRENATAL CARE: CPT

## 2025-04-17 ENCOUNTER — APPOINTMENT (OUTPATIENT)
Dept: ANTEPARTUM | Facility: CLINIC | Age: 35
End: 2025-04-17

## 2025-04-17 ENCOUNTER — ASOB RESULT (OUTPATIENT)
Age: 35
End: 2025-04-17

## 2025-04-17 PROCEDURE — 76819 FETAL BIOPHYS PROFIL W/O NST: CPT | Mod: 59

## 2025-04-17 PROCEDURE — 76816 OB US FOLLOW-UP PER FETUS: CPT

## 2025-04-19 ENCOUNTER — NON-APPOINTMENT (OUTPATIENT)
Age: 35
End: 2025-04-19

## 2025-04-19 ENCOUNTER — EMERGENCY (EMERGENCY)
Facility: HOSPITAL | Age: 35
LOS: 1 days | End: 2025-04-19
Attending: PERSONAL EMERGENCY RESPONSE ATTENDANT
Payer: COMMERCIAL

## 2025-04-19 VITALS
DIASTOLIC BLOOD PRESSURE: 75 MMHG | HEART RATE: 132 BPM | RESPIRATION RATE: 20 BRPM | TEMPERATURE: 99 F | OXYGEN SATURATION: 99 % | SYSTOLIC BLOOD PRESSURE: 112 MMHG | WEIGHT: 210.98 LBS | HEIGHT: 66 IN

## 2025-04-19 LAB
ALBUMIN SERPL ELPH-MCNC: 3 G/DL — LOW (ref 3.3–5)
ALP SERPL-CCNC: 126 U/L — HIGH (ref 40–120)
ALT FLD-CCNC: 8 U/L — LOW (ref 10–45)
ANION GAP SERPL CALC-SCNC: 14 MMOL/L — SIGNIFICANT CHANGE UP (ref 5–17)
AST SERPL-CCNC: 10 U/L — SIGNIFICANT CHANGE UP (ref 10–40)
BASOPHILS # BLD AUTO: 0 K/UL — SIGNIFICANT CHANGE UP (ref 0–0.2)
BASOPHILS NFR BLD AUTO: 0 % — SIGNIFICANT CHANGE UP (ref 0–2)
BILIRUB SERPL-MCNC: 0.3 MG/DL — SIGNIFICANT CHANGE UP (ref 0.2–1.2)
BUN SERPL-MCNC: 8 MG/DL — SIGNIFICANT CHANGE UP (ref 7–23)
CALCIUM SERPL-MCNC: 9.8 MG/DL — SIGNIFICANT CHANGE UP (ref 8.4–10.5)
CHLORIDE SERPL-SCNC: 107 MMOL/L — SIGNIFICANT CHANGE UP (ref 96–108)
CO2 SERPL-SCNC: 14 MMOL/L — LOW (ref 22–31)
CREAT SERPL-MCNC: 0.59 MG/DL — SIGNIFICANT CHANGE UP (ref 0.5–1.3)
EGFR: 122 ML/MIN/1.73M2 — SIGNIFICANT CHANGE UP
EGFR: 122 ML/MIN/1.73M2 — SIGNIFICANT CHANGE UP
EOSINOPHIL # BLD AUTO: 0 K/UL — SIGNIFICANT CHANGE UP (ref 0–0.5)
EOSINOPHIL NFR BLD AUTO: 0 % — SIGNIFICANT CHANGE UP (ref 0–6)
GLUCOSE SERPL-MCNC: 92 MG/DL — SIGNIFICANT CHANGE UP (ref 70–99)
HCT VFR BLD CALC: 33.8 % — LOW (ref 34.5–45)
HGB BLD-MCNC: 11 G/DL — LOW (ref 11.5–15.5)
LYMPHOCYTES # BLD AUTO: 1.85 K/UL — SIGNIFICANT CHANGE UP (ref 1–3.3)
LYMPHOCYTES # BLD AUTO: 8.7 % — LOW (ref 13–44)
MANUAL SMEAR VERIFICATION: SIGNIFICANT CHANGE UP
MCHC RBC-ENTMCNC: 26.7 PG — LOW (ref 27–34)
MCHC RBC-ENTMCNC: 32.5 G/DL — SIGNIFICANT CHANGE UP (ref 32–36)
MCV RBC AUTO: 82 FL — SIGNIFICANT CHANGE UP (ref 80–100)
MONOCYTES # BLD AUTO: 0.91 K/UL — HIGH (ref 0–0.9)
MONOCYTES NFR BLD AUTO: 4.3 % — SIGNIFICANT CHANGE UP (ref 2–14)
NEUTROPHILS # BLD AUTO: 18.48 K/UL — HIGH (ref 1.8–7.4)
NEUTROPHILS NFR BLD AUTO: 87 % — HIGH (ref 43–77)
PLAT MORPH BLD: NORMAL — SIGNIFICANT CHANGE UP
PLATELET # BLD AUTO: 247 K/UL — SIGNIFICANT CHANGE UP (ref 150–400)
POTASSIUM SERPL-MCNC: 4 MMOL/L — SIGNIFICANT CHANGE UP (ref 3.5–5.3)
POTASSIUM SERPL-SCNC: 4 MMOL/L — SIGNIFICANT CHANGE UP (ref 3.5–5.3)
PROT SERPL-MCNC: 6.3 G/DL — SIGNIFICANT CHANGE UP (ref 6–8.3)
RBC # BLD: 4.12 M/UL — SIGNIFICANT CHANGE UP (ref 3.8–5.2)
RBC # FLD: 13.5 % — SIGNIFICANT CHANGE UP (ref 10.3–14.5)
RBC BLD AUTO: SIGNIFICANT CHANGE UP
SODIUM SERPL-SCNC: 135 MMOL/L — SIGNIFICANT CHANGE UP (ref 135–145)
WBC # BLD: 21.24 K/UL — HIGH (ref 3.8–10.5)
WBC # FLD AUTO: 21.24 K/UL — HIGH (ref 3.8–10.5)

## 2025-04-19 PROCEDURE — 99283 EMERGENCY DEPT VISIT LOW MDM: CPT

## 2025-04-19 PROCEDURE — 85025 COMPLETE CBC W/AUTO DIFF WBC: CPT

## 2025-04-19 PROCEDURE — 80053 COMPREHEN METABOLIC PANEL: CPT

## 2025-04-19 PROCEDURE — 99285 EMERGENCY DEPT VISIT HI MDM: CPT

## 2025-04-19 NOTE — ED PROVIDER NOTE - CLINICAL SUMMARY MEDICAL DECISION MAKING FREE TEXT BOX
Attending MD Huang.  Pt is a 35 yo fem  at ~35-36 wks gestation, renal stone dxed at ~17 wks gestation, hashimoto's, PCOS who presented to ED with complaint of gradual onset HA this morning for which she took 1500 mg tylenol at 0900 and had improvement in HA.  Shortly thereafter pt noted onset of bilateral flank pain and contractions.  Contractions improved but flank pain remains.  No pelvic pain/vaginal bleeding/leakage of fluid.  Pt endorses feeling persistent fetal movement.  Pt endorses progression throughout day of recurrent HA, body aches, sore throat and bilateral calf discomfort.  No cough.  Pt's toddler has had URI for 1-2 wks.  Exam non-actionable on arrival to ED.      OB/L&D called to discuss ED care with OB huddle vs. transport to L&D.  Pt mentating but noted to be tachycardic.  Abdomen soft, non-tender.  No current contractions or pelvic pressure.  L&D made aware of concern for UTI vs. viral syndrome with hx above.  L&D requesting transport to their triage for maternal/fetal eval.  Pt maintaining pressure, perfusion and well appearing despite tachycardia.  Basic labs drawn in interim and pt transported to L&D who are waiting to receive.

## 2025-04-19 NOTE — ED ADULT NURSE NOTE - OBJECTIVE STATEMENT
patient is a 33 y/o F  who presents to the ED c/o back pain that "feels like contractions". MD Huang at bedside to asses, 20 G IV placed in LFA as per MD. as per OB patient to be sent to L&D for further eval

## 2025-04-19 NOTE — ED ADULT TRIAGE NOTE - CHIEF COMPLAINT QUOTE
35 wks pregnant. had migraine earlier and took medication now having B/L back pain, denies vaginal bleeding or discharge

## 2025-04-22 ENCOUNTER — APPOINTMENT (OUTPATIENT)
Dept: OBGYN | Facility: CLINIC | Age: 35
End: 2025-04-22
Payer: COMMERCIAL

## 2025-04-22 ENCOUNTER — NON-APPOINTMENT (OUTPATIENT)
Age: 35
End: 2025-04-22

## 2025-04-22 ENCOUNTER — EMERGENCY (EMERGENCY)
Facility: HOSPITAL | Age: 35
LOS: 1 days | End: 2025-04-22
Attending: STUDENT IN AN ORGANIZED HEALTH CARE EDUCATION/TRAINING PROGRAM
Payer: COMMERCIAL

## 2025-04-22 VITALS
TEMPERATURE: 98 F | HEART RATE: 102 BPM | RESPIRATION RATE: 18 BRPM | HEIGHT: 66 IN | SYSTOLIC BLOOD PRESSURE: 125 MMHG | DIASTOLIC BLOOD PRESSURE: 86 MMHG | OXYGEN SATURATION: 96 %

## 2025-04-22 VITALS
SYSTOLIC BLOOD PRESSURE: 107 MMHG | WEIGHT: 214.38 LBS | DIASTOLIC BLOOD PRESSURE: 80 MMHG | HEIGHT: 66 IN | BODY MASS INDEX: 34.45 KG/M2

## 2025-04-22 VITALS
HEART RATE: 93 BPM | RESPIRATION RATE: 18 BRPM | OXYGEN SATURATION: 100 % | TEMPERATURE: 98 F | DIASTOLIC BLOOD PRESSURE: 70 MMHG | SYSTOLIC BLOOD PRESSURE: 102 MMHG

## 2025-04-22 DIAGNOSIS — Z34.93 ENCOUNTER FOR SUPERVISION OF NORMAL PREGNANCY, UNSPECIFIED, THIRD TRIMESTER: ICD-10-CM

## 2025-04-22 LAB
ALBUMIN SERPL ELPH-MCNC: 2.9 G/DL — LOW (ref 3.3–5)
ALP SERPL-CCNC: 124 U/L — HIGH (ref 40–120)
ALT FLD-CCNC: 9 U/L — LOW (ref 10–45)
ANION GAP SERPL CALC-SCNC: 12 MMOL/L — SIGNIFICANT CHANGE UP (ref 5–17)
AST SERPL-CCNC: 12 U/L — SIGNIFICANT CHANGE UP (ref 10–40)
BASOPHILS # BLD AUTO: 0.05 K/UL — SIGNIFICANT CHANGE UP (ref 0–0.2)
BASOPHILS NFR BLD AUTO: 0.3 % — SIGNIFICANT CHANGE UP (ref 0–2)
BILIRUB SERPL-MCNC: 0.1 MG/DL — LOW (ref 0.2–1.2)
BUN SERPL-MCNC: 8 MG/DL — SIGNIFICANT CHANGE UP (ref 7–23)
CALCIUM SERPL-MCNC: 9.6 MG/DL — SIGNIFICANT CHANGE UP (ref 8.4–10.5)
CHLORIDE SERPL-SCNC: 108 MMOL/L — SIGNIFICANT CHANGE UP (ref 96–108)
CO2 SERPL-SCNC: 17 MMOL/L — LOW (ref 22–31)
CREAT SERPL-MCNC: 0.53 MG/DL — SIGNIFICANT CHANGE UP (ref 0.5–1.3)
EGFR: 125 ML/MIN/1.73M2 — SIGNIFICANT CHANGE UP
EGFR: 125 ML/MIN/1.73M2 — SIGNIFICANT CHANGE UP
EOSINOPHIL # BLD AUTO: 0.22 K/UL — SIGNIFICANT CHANGE UP (ref 0–0.5)
EOSINOPHIL NFR BLD AUTO: 1.3 % — SIGNIFICANT CHANGE UP (ref 0–6)
FLUAV AG NPH QL: SIGNIFICANT CHANGE UP
FLUBV AG NPH QL: SIGNIFICANT CHANGE UP
GAS PNL BLDV: SIGNIFICANT CHANGE UP
GLUCOSE SERPL-MCNC: 88 MG/DL — SIGNIFICANT CHANGE UP (ref 70–99)
HCG SERPL-ACNC: HIGH MIU/ML
HCT VFR BLD CALC: 30.6 % — LOW (ref 34.5–45)
HGB BLD-MCNC: 9.8 G/DL — LOW (ref 11.5–15.5)
IMM GRANULOCYTES NFR BLD AUTO: 0.8 % — SIGNIFICANT CHANGE UP (ref 0–0.9)
LYMPHOCYTES # BLD AUTO: 15.4 % — SIGNIFICANT CHANGE UP (ref 13–44)
LYMPHOCYTES # BLD AUTO: 2.53 K/UL — SIGNIFICANT CHANGE UP (ref 1–3.3)
MAGNESIUM SERPL-MCNC: 1.8 MG/DL — SIGNIFICANT CHANGE UP (ref 1.6–2.6)
MCHC RBC-ENTMCNC: 26.6 PG — LOW (ref 27–34)
MCHC RBC-ENTMCNC: 32 G/DL — SIGNIFICANT CHANGE UP (ref 32–36)
MCV RBC AUTO: 82.9 FL — SIGNIFICANT CHANGE UP (ref 80–100)
MONOCYTES # BLD AUTO: 1.37 K/UL — HIGH (ref 0–0.9)
MONOCYTES NFR BLD AUTO: 8.4 % — SIGNIFICANT CHANGE UP (ref 2–14)
NEUTROPHILS # BLD AUTO: 12.09 K/UL — HIGH (ref 1.8–7.4)
NEUTROPHILS NFR BLD AUTO: 73.8 % — SIGNIFICANT CHANGE UP (ref 43–77)
NRBC BLD AUTO-RTO: 0 /100 WBCS — SIGNIFICANT CHANGE UP (ref 0–0)
PHOSPHATE SERPL-MCNC: 2.8 MG/DL — SIGNIFICANT CHANGE UP (ref 2.5–4.5)
PLATELET # BLD AUTO: 214 K/UL — SIGNIFICANT CHANGE UP (ref 150–400)
POTASSIUM SERPL-MCNC: 3.8 MMOL/L — SIGNIFICANT CHANGE UP (ref 3.5–5.3)
POTASSIUM SERPL-SCNC: 3.8 MMOL/L — SIGNIFICANT CHANGE UP (ref 3.5–5.3)
PROT SERPL-MCNC: 5.9 G/DL — LOW (ref 6–8.3)
RBC # BLD: 3.69 M/UL — LOW (ref 3.8–5.2)
RBC # FLD: 14 % — SIGNIFICANT CHANGE UP (ref 10.3–14.5)
RSV RNA NPH QL NAA+NON-PROBE: SIGNIFICANT CHANGE UP
SARS-COV-2 RNA SPEC QL NAA+PROBE: SIGNIFICANT CHANGE UP
SODIUM SERPL-SCNC: 137 MMOL/L — SIGNIFICANT CHANGE UP (ref 135–145)
SOURCE RESPIRATORY: SIGNIFICANT CHANGE UP
WBC # BLD: 16.39 K/UL — HIGH (ref 3.8–10.5)
WBC # FLD AUTO: 16.39 K/UL — HIGH (ref 3.8–10.5)

## 2025-04-22 PROCEDURE — 82803 BLOOD GASES ANY COMBINATION: CPT

## 2025-04-22 PROCEDURE — 93010 ELECTROCARDIOGRAM REPORT: CPT

## 2025-04-22 PROCEDURE — 82435 ASSAY OF BLOOD CHLORIDE: CPT

## 2025-04-22 PROCEDURE — 87637 SARSCOV2&INF A&B&RSV AMP PRB: CPT

## 2025-04-22 PROCEDURE — 84702 CHORIONIC GONADOTROPIN TEST: CPT

## 2025-04-22 PROCEDURE — 84145 PROCALCITONIN (PCT): CPT

## 2025-04-22 PROCEDURE — 82330 ASSAY OF CALCIUM: CPT

## 2025-04-22 PROCEDURE — 84132 ASSAY OF SERUM POTASSIUM: CPT

## 2025-04-22 PROCEDURE — 96374 THER/PROPH/DIAG INJ IV PUSH: CPT

## 2025-04-22 PROCEDURE — 82947 ASSAY GLUCOSE BLOOD QUANT: CPT

## 2025-04-22 PROCEDURE — 99285 EMERGENCY DEPT VISIT HI MDM: CPT

## 2025-04-22 PROCEDURE — 83690 ASSAY OF LIPASE: CPT

## 2025-04-22 PROCEDURE — 85014 HEMATOCRIT: CPT

## 2025-04-22 PROCEDURE — 83735 ASSAY OF MAGNESIUM: CPT

## 2025-04-22 PROCEDURE — 84100 ASSAY OF PHOSPHORUS: CPT

## 2025-04-22 PROCEDURE — 0502F SUBSEQUENT PRENATAL CARE: CPT

## 2025-04-22 PROCEDURE — 96375 TX/PRO/DX INJ NEW DRUG ADDON: CPT

## 2025-04-22 PROCEDURE — 85018 HEMOGLOBIN: CPT

## 2025-04-22 PROCEDURE — 85025 COMPLETE CBC W/AUTO DIFF WBC: CPT

## 2025-04-22 PROCEDURE — 80053 COMPREHEN METABOLIC PANEL: CPT

## 2025-04-22 PROCEDURE — 99284 EMERGENCY DEPT VISIT MOD MDM: CPT | Mod: 25

## 2025-04-22 PROCEDURE — 0225U NFCT DS DNA&RNA 21 SARSCOV2: CPT

## 2025-04-22 PROCEDURE — 83605 ASSAY OF LACTIC ACID: CPT

## 2025-04-22 PROCEDURE — 93005 ELECTROCARDIOGRAM TRACING: CPT

## 2025-04-22 PROCEDURE — 84295 ASSAY OF SERUM SODIUM: CPT

## 2025-04-22 RX ORDER — CEFTRIAXONE 500 MG/1
1000 INJECTION, POWDER, FOR SOLUTION INTRAMUSCULAR; INTRAVENOUS ONCE
Refills: 0 | Status: COMPLETED | OUTPATIENT
Start: 2025-04-22 | End: 2025-04-22

## 2025-04-22 RX ORDER — METOCLOPRAMIDE HCL 10 MG
10 TABLET ORAL ONCE
Refills: 0 | Status: COMPLETED | OUTPATIENT
Start: 2025-04-22 | End: 2025-04-22

## 2025-04-22 RX ORDER — OFLOXACIN 0.3 %
5 DROPS OTIC (EAR) ONCE
Refills: 0 | Status: COMPLETED | OUTPATIENT
Start: 2025-04-22 | End: 2025-04-22

## 2025-04-22 RX ORDER — AMOXICILLIN AND CLAVULANATE POTASSIUM 500; 125 MG/1; MG/1
875 TABLET, FILM COATED ORAL
Qty: 14 | Refills: 0
Start: 2025-04-22 | End: 2025-04-28

## 2025-04-22 RX ADMIN — CEFTRIAXONE 100 MILLIGRAM(S): 500 INJECTION, POWDER, FOR SOLUTION INTRAMUSCULAR; INTRAVENOUS at 03:46

## 2025-04-22 RX ADMIN — Medication 5 DROP(S): at 05:42

## 2025-04-22 RX ADMIN — Medication 1000 MILLILITER(S): at 03:45

## 2025-04-22 RX ADMIN — Medication 10 MILLIGRAM(S): at 03:45

## 2025-04-22 NOTE — ED PROVIDER NOTE - PHYSICAL EXAMINATION
Const: Awake, alert, uncomfortable appearing, covering b/l ears and wincing in pain.  Moving comfortably on stretcher.  HEENT: NC/AT.  Moist mucous membranes.  No pharyngeal erythema, no exudates.  (+) b/l ear effusions, L worse than R, with erythema of the L external auditory canal, no purulent drainage.  Eyes: Extraocular movements intact b/l.  Pupils equal, round, and reactive to light b/l.  Conjunctiva pink.  No scleral icterus.  Neck: Neck supple, full ROM without pain.  Cardiac: Regular rate and regular rhythm. S1 S2 present.  Peripheral pulses 2+ and symmetric.  No LE edema.  No chest wall tenderness, no overlying skin changes.  Resp: Speaking in full sentences. No evidence of respiratory distress.  Good air entry b/l, breath sounds clear to auscultation b/l.  Abd: Gravid abdomen, no overlying skin changes, non-tender, no guarding, no rigidity, no rebound tenderness.  No palpable masses.  MSK: Spine midline and non-tender, no paraspinal tenderness, no palpable deformities or overlying skin changes or open wounds. No CVAT.  Skin: Normal coloration.  No rashes, abrasions or lacerations.  Neuro: Awake, alert & oriented x 3.  Moves all extremities spontaneously and symmetrically.  No focal deficits.

## 2025-04-22 NOTE — ED PROVIDER NOTE - ATTENDING CONTRIBUTION TO CARE
Himanshu Dale MD (Attending Physician):    I performed a history and physical exam of the patient and discussed their management with the resident/fellow/ACP/student. I have reviewed the resident/fellow/ACP/student note and agree with the documented findings and plan of care, except as noted. I have personally performed a substantive portion of the visit including all aspects of the medical decision making. My medical decision making and observations are found below. Please refer to any progress notes for updates on clinical course.    HPI:  34-year-old F patient currently 36 weeks pregnant () with history of Hashimoto's, PCOS, migraines with aura, presenting to the ED for evaluation of severe headache beginning at 2 AM today. Patient seen in the ED 2 days ago for URI symptoms, gradual onset headaches, body aches, throat pain, patient cleared by the ED team and also cleared by the OB/GYN team. Patient states she went to sleep feeling well last night, then woke up at 2 AM with an acute onset severe headache, pain mostly at the bilateral ears, left worse than right. Patient notes history of frequent ear infections when she was younger, notes her current severe headache feels similar to previous ear infections, last ear infection was 10 years ago. Patient states pregnancy is relatively unremarkable, last ultrasound 2 weeks ago was unremarkable. Denies fever, cp, sob, abd pain, urinary symptoms, vaginal bleeding, vaginal discharge.    PE:  Vitals noted  GEN - +In mild discomfort, A&Ox3  HEAD - NC/AT  EYES - PERRL, EOMI  ENT - Airway patent, mucous membranes moist, oropharynx wnl (no erythema, swelling, exudates, or lesions). R ear wnl. B/l mastoid bones wnl. +Erythema of the L external auditory canal, no purulent drainage, mild bulging of L TM.  NECK - Supple, non-tender without lymphadenopathy, no masses  PULMONARY - Normal wob, CTA b/l, symmetric breath sounds, no W/R/R, satting 98% on RA  CARDIAC - +S1S2, RRR, no M/G/R, no JVD  ABDOMEN - +BS, +gravid abdomen, NT, soft, no guarding, no rebound, no masses, no rigidity   - No CVA TTP b/l  EXTREMITIES - FROM, symmetric pulses, no edema  SKIN - No rash or bruising  NEUROLOGIC - Alert, speech clear, moving all extremities spontaneously, CN II-XII grossly intact, no pronator drift, normal gait, strength and sensation grossly intact, FTN negative b/l  PSYCH - Normal mood/affect, normal insight    MDM:  DDx includes, but not limited to: left ear otitis externa and media. Low suspicion for intracranial bleed. Low suspicion for any pregnancy related pathologies. Will also evaluate for viral syndrome. ekg, labs, reglan, IVF, abx for both otitis externa and media, bedside POCUS to evaluate FHR, ob/gyn consult for NST. Dispo pending w/u.

## 2025-04-22 NOTE — ED ADULT NURSE NOTE - OBJECTIVE STATEMENT
34 year old female coming to ED complaining of bilateral ear pan. A&Ox4. Patient is currently 6 weeks pregnant. Patient ambulatory independently with steady gait. Patient states she has been having bilateral ear pain and headache. Patient denies any vaginal bleeding, abdominal pain. Patient denies chest pain, SOB nausea, vomiting. Patient breathing spontaneously and unlabored.

## 2025-04-22 NOTE — PROVIDER CONTACT NOTE (OTHER) - ASSESSMENT
NST for 20 minutes, baseline fh 130 with + accels, no decels noted, pt denies any abdominal pain or discomfort

## 2025-04-22 NOTE — ED PROVIDER NOTE - CARE PLAN
Principal Discharge DX:	Acute otitis media   1 Principal Discharge DX:	Acute otitis externa of left ear  Secondary Diagnosis:	Acute otitis media

## 2025-04-22 NOTE — ED PROVIDER NOTE - CLINICAL SUMMARY MEDICAL DECISION MAKING FREE TEXT BOX
This is a 34-year-old F patient currently 36 weeks pregnant, G4, P1, with history of Hashimoto's, PCOS, migraines with aura, presenting to the ED for evaluation of severe headache beginning at 2 AM today, patient seen in the ED 2 days ago for URI symptoms, gradual onset headaches, body aches, throat pain, patient cleared by the ED and also cleared by the OB team, patient states she went to sleep feeling well last night, then woke up at 2 AM with an acute onset severe headache, pain mostly at the bilateral ears, left worse than right.  Patient notes history of frequent ear infections when she was younger, notes her current severe headache feels similar to previous ear infections, last ear infection was 10 years ago.  Patient states pregnancy is relatively unremarkable, last ultrasound 2 weeks ago looked good.    Patient very uncomfortable appearing in the ER, holding bilateral ears, vitals with low-grade tachycardia heart rate 101 in triage, blood pressure stable 125/86, aside from being very uncomfortable benign physical exam, no neurological deficits, patient ambulatory without difficulty, bilateral TMs with effusion, left worse than right, left ER with erythema, likely left-sided acute otitis media, will treat symptomatically with pain control, antibiotics for ear infection, IV fluids for rehydration, will speak with OB for NST, follow-up results and reassess.

## 2025-04-22 NOTE — PROVIDER CONTACT NOTE (OTHER) - ACTION/TREATMENT ORDERED:
pt educated reason for NST, pt denies any pain, leakage of fluids or bleeding, tracing reviewed with amado Maguire to d/c NST as per MD Hobson after tracing reviewed

## 2025-04-22 NOTE — ED PROVIDER NOTE - PROGRESS NOTE DETAILS
Rigo GONZALEZ), PGY-2: bedside POCUS fetal HR 185bpm. Rigo GONZALEZ), PGY-2: Spoke with gynecology, discussed patient history and clinical presentation, given patient has no OB complaints they will send OB nurse down to the ED for NST, no indication for full OB/GYN consult at this time. Rigo GONZALEZ), PGY-2: NST unremarkable per OBGYN team, fetal HR with baseline 130s.  pt reports significant improvement in HA, currently 4/10 in intensity.

## 2025-04-22 NOTE — ED PROVIDER NOTE - PATIENT PORTAL LINK FT
You can access the FollowMyHealth Patient Portal offered by [facility] by registering at the following website: [email or web address]. By joining [employer]’s FollowMyHealth portal, you will also be able to view your health information using other applications (apps) compatible with our system. You can access the FollowMyHealth Patient Portal offered by [facility] by registering at the following website: [email or web address]. By joining [employer]’s FollowMyHealth portal, you will also be able to view your health information using other applications (apps) compatible with our system.

## 2025-04-22 NOTE — ED PROVIDER NOTE - NSFOLLOWUPINSTRUCTIONS_ED_ALL_ED_FT
PLEASE SEE ALL RESULTS BELOW.  Please review these results with your primary care physician to establish any follow ups as required.  Please follow up with your primary care physician within 1-3 days for continued care and evaluation.    You can take over the counter Tylenol up to 1000mg every 6 hours as needed for pain, please follow the instructions on the bottle.    PLEASE TAKE AUGMENTIN (AMOXICILLIN) (antibiotic) EVERY 12 HOURS FOR THE NEXT WEEK.    Please RETURN TO THE EMERGENCY DEPARTMENT OR SEEK IMMEDIATE MEDICAL ATTENTION if you experience any new or worsening symptoms, including but not limited to: fevers, chills, persistent nausea or vomiting or diarrhea, significant fatigue, significantly decreased physical activity, inability to stand or walk on your own, inability to hold down foods or fluids because of nausea or vomiting, fainting, severe headaches, vision changes, chest pain, shortness of breath, bloody urine, coughing up or throwing up blood, bloody stools, incontinence of urine or stool. PLEASE SEE ALL RESULTS BELOW.  Please review these results with your primary care physician to establish any follow ups as required.  Please follow up with your primary care physician within 1-3 days for continued care and evaluation.    You can take over the counter Tylenol up to 1000mg every 6 hours as needed for pain, please follow the instructions on the bottle.    PLEASE TAKE AUGMENTIN (AMOXICILLIN) (antibiotic) EVERY 12 HOURS FOR THE NEXT WEEK.    PLEASE USE OFLOXACIN EAR DROPS, 5 DROPS IN THE LEFT EAR EVERY 12 HOURS FOR THE NEXT WEEK.    Please RETURN TO THE EMERGENCY DEPARTMENT OR SEEK IMMEDIATE MEDICAL ATTENTION if you experience any new or worsening symptoms, including but not limited to: fevers, chills, persistent nausea or vomiting or diarrhea, significant fatigue, significantly decreased physical activity, inability to stand or walk on your own, inability to hold down foods or fluids because of nausea or vomiting, fainting, severe headaches, vision changes, chest pain, shortness of breath, bloody urine, coughing up or throwing up blood, bloody stools, incontinence of urine or stool.

## 2025-04-23 ENCOUNTER — TRANSCRIPTION ENCOUNTER (OUTPATIENT)
Age: 35
End: 2025-04-23

## 2025-04-23 DIAGNOSIS — N64.59 OTHER SIGNS AND SYMPTOMS IN BREAST: ICD-10-CM

## 2025-04-23 LAB
GP B STREP DNA SPEC QL NAA+PROBE: NOT DETECTED
SOURCE GBS: NORMAL

## 2025-04-25 ENCOUNTER — APPOINTMENT (OUTPATIENT)
Dept: ANTEPARTUM | Facility: CLINIC | Age: 35
End: 2025-04-25
Payer: COMMERCIAL

## 2025-04-25 ENCOUNTER — ASOB RESULT (OUTPATIENT)
Age: 35
End: 2025-04-25

## 2025-04-25 PROCEDURE — 76818 FETAL BIOPHYS PROFILE W/NST: CPT

## 2025-05-02 ENCOUNTER — NON-APPOINTMENT (OUTPATIENT)
Age: 35
End: 2025-05-02

## 2025-05-02 ENCOUNTER — ASOB RESULT (OUTPATIENT)
Age: 35
End: 2025-05-02

## 2025-05-02 ENCOUNTER — APPOINTMENT (OUTPATIENT)
Dept: ANTEPARTUM | Facility: CLINIC | Age: 35
End: 2025-05-02
Payer: COMMERCIAL

## 2025-05-02 ENCOUNTER — APPOINTMENT (OUTPATIENT)
Dept: OBGYN | Facility: CLINIC | Age: 35
End: 2025-05-02
Payer: COMMERCIAL

## 2025-05-02 VITALS
DIASTOLIC BLOOD PRESSURE: 83 MMHG | WEIGHT: 215.38 LBS | SYSTOLIC BLOOD PRESSURE: 129 MMHG | BODY MASS INDEX: 34.61 KG/M2 | HEIGHT: 66 IN

## 2025-05-02 PROCEDURE — 76818 FETAL BIOPHYS PROFILE W/NST: CPT

## 2025-05-02 PROCEDURE — 0502F SUBSEQUENT PRENATAL CARE: CPT

## 2025-05-06 ENCOUNTER — NON-APPOINTMENT (OUTPATIENT)
Age: 35
End: 2025-05-06

## 2025-05-08 ENCOUNTER — APPOINTMENT (OUTPATIENT)
Dept: OBGYN | Facility: CLINIC | Age: 35
End: 2025-05-08
Payer: COMMERCIAL

## 2025-05-08 ENCOUNTER — TRANSCRIPTION ENCOUNTER (OUTPATIENT)
Age: 35
End: 2025-05-08

## 2025-05-08 VITALS
BODY MASS INDEX: 35.03 KG/M2 | DIASTOLIC BLOOD PRESSURE: 83 MMHG | WEIGHT: 218 LBS | HEIGHT: 66 IN | SYSTOLIC BLOOD PRESSURE: 110 MMHG

## 2025-05-08 DIAGNOSIS — N76.2 ACUTE VULVITIS: ICD-10-CM

## 2025-05-08 PROCEDURE — 0502F SUBSEQUENT PRENATAL CARE: CPT | Mod: 25

## 2025-05-08 PROCEDURE — 99212 OFFICE O/P EST SF 10 MIN: CPT | Mod: 25

## 2025-05-08 RX ORDER — CLOTRIMAZOLE AND BETAMETHASONE DIPROPIONATE 10; .5 MG/G; MG/G
1-0.05 CREAM TOPICAL TWICE DAILY
Qty: 1 | Refills: 2 | Status: ACTIVE | COMMUNITY
Start: 2025-05-08 | End: 1900-01-01

## 2025-05-09 ENCOUNTER — APPOINTMENT (OUTPATIENT)
Dept: ANTEPARTUM | Facility: CLINIC | Age: 35
End: 2025-05-09

## 2025-05-09 PROCEDURE — 76818 FETAL BIOPHYS PROFILE W/NST: CPT

## 2025-05-09 PROCEDURE — 76816 OB US FOLLOW-UP PER FETUS: CPT

## 2025-05-15 ENCOUNTER — APPOINTMENT (OUTPATIENT)
Dept: INTERNAL MEDICINE | Facility: CLINIC | Age: 35
End: 2025-05-15

## 2025-05-16 ENCOUNTER — ASOB RESULT (OUTPATIENT)
Age: 35
End: 2025-05-16

## 2025-05-16 ENCOUNTER — APPOINTMENT (OUTPATIENT)
Dept: ANTEPARTUM | Facility: CLINIC | Age: 35
End: 2025-05-16
Payer: COMMERCIAL

## 2025-05-16 ENCOUNTER — APPOINTMENT (OUTPATIENT)
Dept: OBGYN | Facility: CLINIC | Age: 35
End: 2025-05-16
Payer: COMMERCIAL

## 2025-05-16 VITALS
DIASTOLIC BLOOD PRESSURE: 74 MMHG | WEIGHT: 218 LBS | BODY MASS INDEX: 35.03 KG/M2 | SYSTOLIC BLOOD PRESSURE: 113 MMHG | HEIGHT: 66 IN

## 2025-05-16 PROCEDURE — 76818 FETAL BIOPHYS PROFILE W/NST: CPT

## 2025-05-16 PROCEDURE — 0502F SUBSEQUENT PRENATAL CARE: CPT
